# Patient Record
Sex: MALE | Race: WHITE | NOT HISPANIC OR LATINO | Employment: FULL TIME | URBAN - METROPOLITAN AREA
[De-identification: names, ages, dates, MRNs, and addresses within clinical notes are randomized per-mention and may not be internally consistent; named-entity substitution may affect disease eponyms.]

---

## 2017-04-07 ENCOUNTER — HOSPITAL ENCOUNTER (OUTPATIENT)
Facility: MEDICAL CENTER | Age: 42
End: 2017-04-07
Attending: PHYSICIAN ASSISTANT
Payer: COMMERCIAL

## 2017-04-07 ENCOUNTER — OFFICE VISIT (OUTPATIENT)
Dept: URGENT CARE | Facility: PHYSICIAN GROUP | Age: 42
End: 2017-04-07
Payer: COMMERCIAL

## 2017-04-07 VITALS
DIASTOLIC BLOOD PRESSURE: 84 MMHG | BODY MASS INDEX: 34.98 KG/M2 | TEMPERATURE: 99.3 F | OXYGEN SATURATION: 94 % | WEIGHT: 230 LBS | SYSTOLIC BLOOD PRESSURE: 132 MMHG | RESPIRATION RATE: 16 BRPM | HEART RATE: 68 BPM

## 2017-04-07 DIAGNOSIS — L05.01 PILONIDAL CYST WITH ABSCESS: Primary | ICD-10-CM

## 2017-04-07 PROCEDURE — 87070 CULTURE OTHR SPECIMN AEROBIC: CPT

## 2017-04-07 PROCEDURE — 10061 I&D ABSCESS COMP/MULTIPLE: CPT | Performed by: PHYSICIAN ASSISTANT

## 2017-04-07 RX ORDER — SULFAMETHOXAZOLE AND TRIMETHOPRIM 800; 160 MG/1; MG/1
1 TABLET ORAL 2 TIMES DAILY
Qty: 20 TAB | Refills: 0 | Status: SHIPPED | OUTPATIENT
Start: 2017-04-07 | End: 2017-04-07 | Stop reason: SDUPTHER

## 2017-04-07 RX ORDER — HYDROCODONE BITARTRATE AND ACETAMINOPHEN 5; 325 MG/1; MG/1
1-2 TABLET ORAL EVERY 4 HOURS PRN
Qty: 20 TAB | Refills: 0 | Status: SHIPPED | OUTPATIENT
Start: 2017-04-07 | End: 2017-04-10

## 2017-04-07 RX ORDER — SULFAMETHOXAZOLE AND TRIMETHOPRIM 800; 160 MG/1; MG/1
1 TABLET ORAL 2 TIMES DAILY
Qty: 20 TAB | Refills: 0 | Status: SHIPPED | OUTPATIENT
Start: 2017-04-07 | End: 2017-04-17

## 2017-04-07 NOTE — MR AVS SNAPSHOT
Jignesh Miguel   2017 5:45 PM   Office Visit   MRN: [id number]    Department:  [facility]   Dept Phone:  [phone]    Description:  Male : 1975   Provider:  Gaurav Zavaleta PA-C           Reason for Visit     Cyst on buttock near crack x 2 months - now getting bigger      Allergies as of 2017     No Known Allergies      You were diagnosed with     Pilonidal cyst with abscess   [685.0.ICD-9-CM]  -  Primary       Vital Signs     Blood Pressure Pulse Temperature Respirations Weight Oxygen Saturation    132/84 mmHg 68 37.4 °C (99.3 °F) 16 104.327 kg (230 lb) 94%    Smoking Status                   Never Smoker            Basic Information     Date Of Birth Sex Race Ethnicity Preferred Language    1975 Male White Non- English      Problem List              ICD-10-CM Priority Class Noted - Resolved    Disorder of male genital organs N50.9   2015 - Present      Health Maintenance        Date Due Completion Dates    IMM DTaP/Tdap/Td Vaccine (1 - Tdap) 1995 ---            Current Immunizations     No immunizations on file.      Below and/or attached are the medications your provider expects you to take. Review all of your home medications and newly ordered medications with your provider and/or pharmacist. Follow medication instructions as directed by your provider and/or pharmacist. Please keep your medication list with you and share with your provider. Update the information when medications are discontinued, doses are changed, or new medications (including over-the-counter products) are added; and carry medication information at all times in the event of emergency situations     Allergies:  No Known Allergies          Medications  Valid as of: 2017 -  7:05 PM    Generic Name Brand Name Tablet Size Instructions for use    Hydrocodone-Acetaminophen (Tab) NORCO 5-325 MG Take 1-2 Tabs by mouth every four hours as needed for up to 3 days.        Metoprolol-Hydrochlorothiazide (TABLET SR 24 HR) Metoprolol-Hydrochlorothiazide 25-12.5 MG Take 12.5 mg by mouth every day.        Sulfamethoxazole-Trimethoprim (Tab) BACTRIM -160 MG Take 1 Tab by mouth 2 times a day for 10 days.        .                 Medicines prescribed today were sent to:     [facility] [address] [place], [place] - [address]    [address] [place] [address]    Phone: [phone] Fax: [phone]    Open 24 Hours?: No      Medication refill instructions:       If your prescription bottle indicates you have medication refills left, it is not necessary to call your provider’s office. Please contact your pharmacy and they will refill your medication.    If your prescription bottle indicates you do not have any refills left, you may request refills at any time through one of the following ways: The online [employer] system (except Urgent Care), by calling your provider’s office, or by asking your pharmacy to contact your provider’s office with a refill request. Medication refills are processed only during regular business hours and may not be available until the next business day. Your provider may request additional information or to have a follow-up visit with you prior to refilling your medication.   *Please Note: Medication refills are assigned a new Rx number when refilled electronically. Your pharmacy may indicate that no refills were authorized even though a new prescription for the same medication is available at the pharmacy. Please request the medicine by name with the pharmacy before contacting your provider for a refill.        Instructions    Incision and Drainage of a Pilonidal Cyst  Incision and drainage is a surgical procedure to open and drain a fluid-filled sac that forms around a hair follicle in the tailbone area between your buttocks (pilonidal cyst). You may need this procedure if the cyst becomes painful, swollen, or infected.  There are three types of  procedures that may be done. The type of procedure you have depends on the size and severity of your infected cyst. The procedure may be:  · Incision and drainage with a special type of bandage (wound packing). Packing is used for wounds that are deep or tunnel under the skin.  · Marsupialization. In this procedure, the cyst will be opened and kept open. The edges of the incision will be stitched together to make a pocket.  · Incision and drainage without wound packing.  LET YOUR HEALTH CARE PROVIDER KNOW ABOUT:  · Any allergies you have.  · All medicines you are taking, including vitamins, herbs, eye drops, creams, and over-the-counter medicines.  · Previous problems you or members of your family have had with the use of anesthetics.  · Any blood disorders you have.  · Previous surgeries you have had.  · Medical conditions you have.  RISKS AND COMPLICATIONS  Generally, this is a safe procedure. However, problems can occur and include:  · Infection.  · Bleeding.  · Having another cyst develop.  · Need for more surgery.  BEFORE THE PROCEDURE  · Ask your health care provider about:  ¨ Changing or stopping your regular medicines. This is especially important if you are taking diabetes medicines or blood thinners.  ¨ Taking medicines such as aspirin and ibuprofen. These medicines can thin your blood. Do not take these medicines before your procedure if your health care provider tells you not to.  ¨ Taking antibiotics before surgery to control the infection.  · Do not eat or drink anything for 6-8 hours before the procedure if you are having general anesthesia.  · Take a shower the night before the procedure to clean your buttocks area. Take another shower in the morning before surgery.  · Plan to have someone take you home after the procedure.  PROCEDURE   · You will have an IV tube inserted in a vein in your hand or arm.  · You will be given one of the following:  ¨ A medicine that numbs the area (local  anesthetic).  ¨ A medicine that makes you go to sleep (general anesthetic).  · You also may be given medicine to help you relax during the procedure (sedative).  · You will lie face down on the operating table.  · Your buttocks area may be shaved.  · Tape may be used to spread your buttocks.  · Germ-killing solution (antiseptic) may be used to clean the area.  Incision and Drainage With Wound Packing:   · Your surgeon will make a surgical cut (incision) over the cyst to open it.  · A probe may be used to see if there are tunnels extending away from the cyst under your skin.  · Fluid or pus inside the cyst will be drained.  · The cyst will be flushed out with a germ-free (sterile) solution.  · Packing will be placed into the open cyst. This keeps it open and draining after surgery.  · The area will be covered with a bandage (dressing).  Marsupialization:  · Your surgeon will make a surgical cut (incision) over the cyst to open it.  · A probe may be used to see if there are tunnels extending away from the cyst under your skin.  · Fluid or pus inside the cyst will be drained.  · The cyst will be flushed out with a germ-free (sterile) solution.  · The edges of the incision will be stitched (sutured) to the skin to keep it wide open. The cyst will not be packed.  · A rolled-up bandage (dressing) will be taped over the incision.  Incision and Drainage Without Packing:   · Your surgeon will make a surgical cut (incision) over the cyst to open it.  · A probe may be used to see if there are tunnels extending away from the cyst under your skin.  · Fluid or pus inside the cyst will be drained.  · The cyst will be flushed out with a germ-free (sterile) solution.  · Your surgeon may also remove the tissue around the opened cyst.  · The incision then will be closed with stitches (sutures). It will not be left open, and packing will not be used.  · A bandage (dressing) will be put over the incision area.  AFTER THE PROCEDURE  · If  you had general anesthesia, you will be taken to a recovery area. Your blood pressure, heart rate, breathing rate, and blood oxygen level will be monitored often until the medicines you were given have worn off.  · It is normal to have some pain after this procedure. You may be given pain medicine.  · Your IV tube can be taken out after you have recovered and your pain is under control.     This information is not intended to replace advice given to you by your health care provider. Make sure you discuss any questions you have with your health care provider.     Document Released: 07/15/2015 Document Reviewed: 07/15/2015  [employer] Interactive Patient Education ©2016 Elsevier Inc.            Innovative Roads Access Code: [email or web address]  Expires: 4/30/2017 10:52 AM    Innovative Roads  A secure, online tool to manage your health information     [employer]’s Innovative Roads® is a secure, online tool that connects you to your personalized health information from the privacy of your home -- day or night - making it very easy for you to manage your healthcare. Once the activation process is completed, you can even access your medical information using the Innovative Roads daisha, which is available for free in the Apple Daisha store or Google Play store.     Innovative Roads provides the following levels of access (as shown below):   My Chart Features   [facility] Doctor [facility]  Specialists [facility]  Urgent  Care Non-[facility]  Primary Care  Doctor   Email your healthcare team securely and privately 24/7 X X X    Manage appointments: schedule your next appointment; view details of past/upcoming appointments X      Request prescription refills. X      View recent personal medical records, including lab and immunizations X X X X   View health record, including health history, allergies, medications X X X X   Read reports about your outpatient visits, procedures, consult and ER notes X X X X   See your discharge summary, which is a recap of your hospital and/or ER  visit that includes your diagnosis, lab results, and care plan. X X       How to register for Live Life 360:  1. Go to  [email or web address].  2. Click on the Sign Up Now box, which takes you to the New Member Sign Up page. You will need to provide the following information:  a. Enter your Live Life 360 Access Code exactly as it appears at the top of this page. (You will not need to use this code after you’ve completed the sign-up process. If you do not sign up before the expiration date, you must request a new code.)   b. Enter your date of birth.   c. Enter your home email address.   d. Click Submit, and follow the next screen’s instructions.  3. Create a LearnZilliont ID. This will be your LearnZilliont login ID and cannot be changed, so think of one that is secure and easy to remember.  4. Create a LearnZilliont password. You can change your password at any time.  5. Enter your Password Reset Question and Answer. This can be used at a later time if you forget your password.   6. Enter your e-mail address. This allows you to receive e-mail notifications when new information is available in Live Life 360.  7. Click Sign Up. You can now view your health information.    For assistance activating your Live Life 360 account, call [phone]

## 2017-04-07 NOTE — Clinical Note
April 7, 2017       Patient: Jignesh Miguel   YOB: 1975   Date of Visit: 4/7/2017         To Whom It May Concern:    It is my medical opinion that Jignesh Miguel may be excused from work for the date of 4/10/17.      If you have any questions or concerns, please don't hesitate to call [phone]          Sincerely,          Gaurav Zavaleta PA-C  Electronically Signed

## 2017-04-08 DIAGNOSIS — L05.01 PILONIDAL CYST WITH ABSCESS: ICD-10-CM

## 2017-04-08 NOTE — PATIENT INSTRUCTIONS
Incision and Drainage of a Pilonidal Cyst  Incision and drainage is a surgical procedure to open and drain a fluid-filled sac that forms around a hair follicle in the tailbone area between your buttocks (pilonidal cyst). You may need this procedure if the cyst becomes painful, swollen, or infected.  There are three types of procedures that may be done. The type of procedure you have depends on the size and severity of your infected cyst. The procedure may be:  · Incision and drainage with a special type of bandage (wound packing). Packing is used for wounds that are deep or tunnel under the skin.  · Marsupialization. In this procedure, the cyst will be opened and kept open. The edges of the incision will be stitched together to make a pocket.  · Incision and drainage without wound packing.  LET YOUR HEALTH CARE PROVIDER KNOW ABOUT:  · Any allergies you have.  · All medicines you are taking, including vitamins, herbs, eye drops, creams, and over-the-counter medicines.  · Previous problems you or members of your family have had with the use of anesthetics.  · Any blood disorders you have.  · Previous surgeries you have had.  · Medical conditions you have.  RISKS AND COMPLICATIONS  Generally, this is a safe procedure. However, problems can occur and include:  · Infection.  · Bleeding.  · Having another cyst develop.  · Need for more surgery.  BEFORE THE PROCEDURE  · Ask your health care provider about:  ¨ Changing or stopping your regular medicines. This is especially important if you are taking diabetes medicines or blood thinners.  ¨ Taking medicines such as aspirin and ibuprofen. These medicines can thin your blood. Do not take these medicines before your procedure if your health care provider tells you not to.  ¨ Taking antibiotics before surgery to control the infection.  · Do not eat or drink anything for 6-8 hours before the procedure if you are having general anesthesia.  · Take a shower the night before the  procedure to clean your buttocks area. Take another shower in the morning before surgery.  · Plan to have someone take you home after the procedure.  PROCEDURE   · You will have an IV tube inserted in a vein in your hand or arm.  · You will be given one of the following:  ¨ A medicine that numbs the area (local anesthetic).  ¨ A medicine that makes you go to sleep (general anesthetic).  · You also may be given medicine to help you relax during the procedure (sedative).  · You will lie face down on the operating table.  · Your buttocks area may be shaved.  · Tape may be used to spread your buttocks.  · Germ-killing solution (antiseptic) may be used to clean the area.  Incision and Drainage With Wound Packing:   · Your surgeon will make a surgical cut (incision) over the cyst to open it.  · A probe may be used to see if there are tunnels extending away from the cyst under your skin.  · Fluid or pus inside the cyst will be drained.  · The cyst will be flushed out with a germ-free (sterile) solution.  · Packing will be placed into the open cyst. This keeps it open and draining after surgery.  · The area will be covered with a bandage (dressing).  Marsupialization:  · Your surgeon will make a surgical cut (incision) over the cyst to open it.  · A probe may be used to see if there are tunnels extending away from the cyst under your skin.  · Fluid or pus inside the cyst will be drained.  · The cyst will be flushed out with a germ-free (sterile) solution.  · The edges of the incision will be stitched (sutured) to the skin to keep it wide open. The cyst will not be packed.  · A rolled-up bandage (dressing) will be taped over the incision.  Incision and Drainage Without Packing:   · Your surgeon will make a surgical cut (incision) over the cyst to open it.  · A probe may be used to see if there are tunnels extending away from the cyst under your skin.  · Fluid or pus inside the cyst will be drained.  · The cyst will be  flushed out with a germ-free (sterile) solution.  · Your surgeon may also remove the tissue around the opened cyst.  · The incision then will be closed with stitches (sutures). It will not be left open, and packing will not be used.  · A bandage (dressing) will be put over the incision area.  AFTER THE PROCEDURE  · If you had general anesthesia, you will be taken to a recovery area. Your blood pressure, heart rate, breathing rate, and blood oxygen level will be monitored often until the medicines you were given have worn off.  · It is normal to have some pain after this procedure. You may be given pain medicine.  · Your IV tube can be taken out after you have recovered and your pain is under control.     This information is not intended to replace advice given to you by your health care provider. Make sure you discuss any questions you have with your health care provider.     Document Released: 07/15/2015 Document Reviewed: 07/15/2015  [email or web address][employer] Interactive Patient Education ©2016 Elsevier Inc.

## 2017-04-08 NOTE — PROGRESS NOTES
Subjective:      Pt is a 41 y.o. male who presents with Cyst            Cyst  This is a new problem. The current episode started more than 1 month ago. The problem occurs constantly. The problem has been gradually worsening. The symptoms are aggravated by exertion (sitting or palpation). He has tried ice and NSAIDs for the symptoms. The treatment provided no relief.   Pt notes a growing mass in his gluteal cleft for over a month which has become painful to touch and sit down with. Pt has not taken any Rx medications for this condition. Pt states the pain is a 7/10, aching in nature and worse at night or with sitting. Pt denies CP, SOB, NVD, paresthesias, headaches, dizziness, change in vision, hives, or other joint pain. The pt's medication list, problem list, and allergies have been evaluated and reviewed during today's visit.    PMH:  Past Medical History   Diagnosis Date   • Hypertension    • Psychiatric problem      anxiety       PSH:  Past Surgical History   Procedure Laterality Date   • Orchiectomy  4/8/2015     Performed by Alejandro Alatorre M.D. at SURGERY [facility]       Fam Hx:  Father with hx of HTN    Soc HX:  Social History     Social History   • Marital Status:      Spouse Name: N/A   • Number of Children: N/A   • Years of Education: N/A     Occupational History   • Not on file.     Social History Main Topics   • Smoking status: Never Smoker    • Smokeless tobacco: Never Used   • Alcohol Use: No   • Drug Use: No   • Sexual Activity: Not on file     Other Topics Concern   • Not on file     Social History Narrative   • No narrative on file         Medications:    Current outpatient prescriptions:   •  sulfamethoxazole-trimethoprim (BACTRIM DS) 800-160 MG tablet, Take 1 Tab by mouth 2 times a day for 10 days., Disp: 20 Tab, Rfl: 0  •  hydrocodone-acetaminophen (NORCO) 5-325 MG Tab per tablet, Take 1-2 Tabs by mouth every four hours as needed for up to 3 days., Disp: 20 Tab, Rfl: 0  •   Metoprolol-Hydrochlorothiazide 25-12.5 MG TB24, Take 12.5 mg by mouth every day., Disp: , Rfl:       Allergies:  Review of patient's allergies indicates no known allergies.        ROS  Constitutional: Negative for fever, chills and malaise/fatigue.   HENT: Negative for congestion and sore throat.    Eyes: Negative for blurred vision, double vision and photophobia.   Respiratory: Negative for cough and shortness of breath.    Cardiovascular: Negative for chest pain and palpitations.   Gastrointestinal: Negative for heartburn, nausea, vomiting, abdominal pain, diarrhea and constipation.   Genitourinary: Negative for dysuria and flank pain.   Musculoskeletal: Negative for joint pain and myalgias.   Skin: POS for pilonidal cyst  Neurological: Negative for dizziness, tingling and headaches.   Endo/Heme/Allergies: Does not bruise/bleed easily.   Psychiatric/Behavioral: Negative for depression. The patient is not nervous/anxious.           Objective:     /84 mmHg  Pulse 68  Temp(Src) 37.4 °C (99.3 °F)  Resp 16  Wt 104.327 kg (230 lb)  SpO2 94%     Physical Exam   Skin: Skin is warm. Lesion noted. No abrasion, no bruising, no burn, no ecchymosis, no laceration, no petechiae and no rash noted. He is not diaphoretic. There is erythema.              Constitutional: PT is oriented to person, place, and time. PT appears well-developed and well-nourished. No distress.   HENT:   Head: Normocephalic and atraumatic.   Mouth/Throat: Oropharynx is clear and moist. No oropharyngeal exudate.   Eyes: Conjunctivae normal and EOM are normal. Pupils are equal, round, and reactive to light.   Neck: Normal range of motion. Neck supple. No thyromegaly present.   Cardiovascular: Normal rate, regular rhythm, normal heart sounds and intact distal pulses.  Exam reveals no gallop and no friction rub.    No murmur heard.  Pulmonary/Chest: Effort normal and breath sounds normal. No respiratory distress. PT has no wheezes. PT has no rales.  "Pt exhibits no tenderness.   Abdominal: Soft. Bowel sounds are normal. PT exhibits no distension and no mass. There is no tenderness. There is no rebound and no guarding.   Musculoskeletal: Normal range of motion. PT exhibits no edema and no tenderness.   Neurological: PT is alert and oriented to person, place, and time. PT has normal reflexes. No cranial nerve deficit.       Psychiatric: PT has a normal mood and affect. PT behavior is normal. Judgment and thought content normal.          Assessment/Plan:     1. Pilonidal cyst with abscess    - sulfamethoxazole-trimethoprim (BACTRIM DS) 800-160 MG tablet; Take 1 Tab by mouth 2 times a day for 10 days.  Dispense: 20 Tab; Refill: 0  - hydrocodone-acetaminophen (NORCO) 5-325 MG Tab per tablet; Take 1-2 Tabs by mouth every four hours as needed for up to 3 days.  Dispense: 20 Tab; Refill: 0  - culture and gram stain    Procedure: Incision and Drainage  -Risks, benefits, and alternatives discussed. Risks including infection, bleeding, nerve damage, and poor cosmetic outcome  -Sterile technique throughout  -Local anesthesia with 2% lidocaine with epinephrine  -Incision with #11 blade into fluctuant area with purulent material expressed  -Culture obtained and packaged for lab  -Cavity probed and MULTIPLE loculations bluntly taken down with hemostat  -Irrigated copiously with NS  -Packed with 1/4\" gauze  -Minimal bleeding with good hemostasis achieved  -The patient tolerated the procedure well      [place]  Aware web site evaluation: I have obtained and reviewed patient utilization report from [facility] pharmacy database prior to writing prescription for controlled substance II, III or IV per [place] bill . Based on the report and my clinical assessment the prescription is medically necessary.   NSAIDs for pain 1-5, Norco for pain 6-10 or to help get to sleep.  Wound and dressing care discussed  Rest, fluids encouraged.  AVS with medical info given.  Pt was in full " understanding and agreement with the plan.  Follow-up 3 days for possible repacking and wound check

## 2017-04-10 ENCOUNTER — OFFICE VISIT (OUTPATIENT)
Dept: URGENT CARE | Facility: PHYSICIAN GROUP | Age: 42
End: 2017-04-10
Payer: COMMERCIAL

## 2017-04-10 VITALS
TEMPERATURE: 99 F | HEART RATE: 92 BPM | BODY MASS INDEX: 37.87 KG/M2 | SYSTOLIC BLOOD PRESSURE: 130 MMHG | DIASTOLIC BLOOD PRESSURE: 88 MMHG | OXYGEN SATURATION: 95 % | WEIGHT: 249 LBS

## 2017-04-10 DIAGNOSIS — Z51.89 WOUND CHECK, ABSCESS: Primary | ICD-10-CM

## 2017-04-10 LAB
BACTERIA WND AEROBE CULT: NORMAL
SIGNIFICANT IND 70042: NORMAL
SITE SITE: NORMAL
SOURCE SOURCE: NORMAL

## 2017-04-10 PROCEDURE — 99024 POSTOP FOLLOW-UP VISIT: CPT | Performed by: PHYSICIAN ASSISTANT

## 2017-04-10 NOTE — MR AVS SNAPSHOT
Jignesh Miguel   4/10/2017 5:45 PM   Office Visit   MRN: [id number]    Department:  [facility]   Dept Phone:  [phone]    Description:  Male : 1975   Provider:  Gaurav Zavaleta PA-C           Reason for Visit     Wound Check sore      Allergies as of 4/10/2017     No Known Allergies      You were diagnosed with     Wound check, abscess   [[id number]]  -  Primary       Vital Signs     Blood Pressure Pulse Temperature Weight Oxygen Saturation Smoking Status    130/88 mmHg 92 37.2 °C (99 °F) 112.946 kg (249 lb) 95% Never Smoker       Basic Information     Date Of Birth Sex Race Ethnicity Preferred Language    1975 Male White Non- English      Problem List              ICD-10-CM Priority Class Noted - Resolved    Disorder of male genital organs N50.9   2015 - Present      Health Maintenance        Date Due Completion Dates    IMM DTaP/Tdap/Td Vaccine (1 - Tdap) 1995 ---            Current Immunizations     No immunizations on file.      Below and/or attached are the medications your provider expects you to take. Review all of your home medications and newly ordered medications with your provider and/or pharmacist. Follow medication instructions as directed by your provider and/or pharmacist. Please keep your medication list with you and share with your provider. Update the information when medications are discontinued, doses are changed, or new medications (including over-the-counter products) are added; and carry medication information at all times in the event of emergency situations     Allergies:  No Known Allergies          Medications  Valid as of: April 10, 2017 -  6:07 PM    Generic Name Brand Name Tablet Size Instructions for use    Hydrocodone-Acetaminophen (Tab) NORCO 5-325 MG Take 1-2 Tabs by mouth every four hours as needed for up to 3 days.        Metoprolol-Hydrochlorothiazide (TABLET SR 24 HR) Metoprolol-Hydrochlorothiazide 25-12.5 MG Take 12.5 mg  by mouth every day.        Sulfamethoxazole-Trimethoprim (Tab) BACTRIM -160 MG Take 1 Tab by mouth 2 times a day for 10 days.        .                 Medicines prescribed today were sent to:     [facility] [address] [place] [address] [address] [facility]    [address] [facility] [address]    Phone: [phone] Fax: [phone]    Open 24 Hours?: No      Medication refill instructions:       If your prescription bottle indicates you have medication refills left, it is not necessary to call your provider’s office. Please contact your pharmacy and they will refill your medication.    If your prescription bottle indicates you do not have any refills left, you may request refills at any time through one of the following ways: The online [employer] system (except Urgent Care), by calling your provider’s office, or by asking your pharmacy to contact your provider’s office with a refill request. Medication refills are processed only during regular business hours and may not be available until the next business day. Your provider may request additional information or to have a follow-up visit with you prior to refilling your medication.   *Please Note: Medication refills are assigned a new Rx number when refilled electronically. Your pharmacy may indicate that no refills were authorized even though a new prescription for the same medication is available at the pharmacy. Please request the medicine by name with the pharmacy before contacting your provider for a refill.        Instructions    Abscess  Care After  An abscess (also called a boil or furuncle) is an infected area that contains a collection of pus. Signs and symptoms of an abscess include pain, tenderness, redness, or hardness, or you may feel a moveable soft area under your skin. An abscess can occur anywhere in the body. The infection may spread to surrounding tissues causing cellulitis. A cut (incision) by the surgeon was made over your abscess and  the pus was drained out. Gauze may have been packed into the space to provide a drain that will allow the cavity to heal from the inside outwards. The boil may be painful for 5 to 7 days. Most people with a boil do not have high fevers. Your abscess, if seen early, may not have localized, and may not have been lanced. If not, another appointment may be required for this if it does not get better on its own or with medications.  HOME CARE INSTRUCTIONS   · Only take over-the-counter or prescription medicines for pain, discomfort, or fever as directed by your caregiver.  · When you bathe, soak and then remove gauze or iodoform packs at least daily or as directed by your caregiver. You may then wash the wound gently with mild soapy water. Repack with gauze or do as your caregiver directs.  SEEK IMMEDIATE MEDICAL CARE IF:   · You develop increased pain, swelling, redness, drainage, or bleeding in the wound site.  · You develop signs of generalized infection including muscle aches, chills, fever, or a general ill feeling.  · An oral temperature above 102° F (38.9° C) develops, not controlled by medication.  See your caregiver for a recheck if you develop any of the symptoms described above. If medications (antibiotics) were prescribed, take them as directed.  Document Released: 07/06/2006 Document Revised: 03/11/2013 Document Reviewed: 03/02/2009  [employer]® Patient Information ©2014 [employer]            "[employer]" Access Code: [email or web address]  Expires: 4/30/2017 10:52 AM    "[employer]"  A secure, online tool to manage your health information     [employer]’s "[employer]"® is a secure, online tool that connects you to your personalized health information from the privacy of your home -- day or night - making it very easy for you to manage your healthcare. Once the activation process is completed, you can even access your medical information using the "[employer]" daisha, which is available for free in the Apple Daisha store or [employer]  Play store.     Vertra provides the following levels of access (as shown below):   My Chart Features   [facility] Doctor [facility]  Specialists [facility]  Urgent  Care [facility]  Primary Care  Doctor   Email your healthcare team securely and privately 24/7 X X X    Manage appointments: schedule your next appointment; view details of past/upcoming appointments X      Request prescription refills. X      View recent personal medical records, including lab and immunizations X X X X   View health record, including health history, allergies, medications X X X X   Read reports about your outpatient visits, procedures, consult and ER notes X X X X   See your discharge summary, which is a recap of your hospital and/or ER visit that includes your diagnosis, lab results, and care plan. X X       How to register for Vertra:  1. Go to  [email or web address].  2. Click on the Sign Up Now box, which takes you to the New Member Sign Up page. You will need to provide the following information:  a. Enter your Vertra Access Code exactly as it appears at the top of this page. (You will not need to use this code after you’ve completed the sign-up process. If you do not sign up before the expiration date, you must request a new code.)   b. Enter your date of birth.   c. Enter your home email address.   d. Click Submit, and follow the next screen’s instructions.  3. Create a Vertra ID. This will be your Vertra login ID and cannot be changed, so think of one that is secure and easy to remember.  4. Create a Vertra password. You can change your password at any time.  5. Enter your Password Reset Question and Answer. This can be used at a later time if you forget your password.   6. Enter your e-mail address. This allows you to receive e-mail notifications when new information is available in Vertra.  7. Click Sign Up. You can now view your health information.    For assistance activating your Vertra account, call [phone]

## 2017-04-10 NOTE — Clinical Note
April 10, 2017       Patient: Jignesh Miguel   YOB: 1975   Date of Visit: 4/10/2017         To Whom It May Concern:    It is my medical opinion that Jignesh Miguel may be excused from work for the dates of 4/11/17-4/12/17.      If you have any questions or concerns, please don't hesitate to call [phone]          Sincerely,          Gaurav Zavaleta PA-C  Electronically Signed

## 2017-04-11 NOTE — PROGRESS NOTES
HPI:   Presents for wound check 3 days post procedure/ I&D. Wound without pain or redness, minimal discharge noted. Dressing in place.    ROS:    no fever, no sensory loss, no weakness    Exam:    Gen: WDWN in no distress  Wound: well healing wound.  No evidence of dehiscence or infection.  Neuro: sensory/motor intact     A/P    Abscess I&D wound check  Continue abx, probiotics  F/u prn    DRESSING CHANGE:    Dressing and packing removed with trace drainage    Wound irrigated with NS    Packing not replaced  Dressing applied    Pt tolerated procedure well      cx results:  Component Results      Component     Significant Indicator     NEG     Source     WND     Site     pilonidal cyst     Culture Result Wound     Rare growth Mixed skin emily.   Heavy growth mixed anaerobic emily including   Peptostreptococci and anaerobic gram negative rods.

## 2017-04-11 NOTE — PATIENT INSTRUCTIONS
Abscess  Care After  An abscess (also called a boil or furuncle) is an infected area that contains a collection of pus. Signs and symptoms of an abscess include pain, tenderness, redness, or hardness, or you may feel a moveable soft area under your skin. An abscess can occur anywhere in the body. The infection may spread to surrounding tissues causing cellulitis. A cut (incision) by the surgeon was made over your abscess and the pus was drained out. Gauze may have been packed into the space to provide a drain that will allow the cavity to heal from the inside outwards. The boil may be painful for 5 to 7 days. Most people with a boil do not have high fevers. Your abscess, if seen early, may not have localized, and may not have been lanced. If not, another appointment may be required for this if it does not get better on its own or with medications.  HOME CARE INSTRUCTIONS   · Only take over-the-counter or prescription medicines for pain, discomfort, or fever as directed by your caregiver.  · When you bathe, soak and then remove gauze or iodoform packs at least daily or as directed by your caregiver. You may then wash the wound gently with mild soapy water. Repack with gauze or do as your caregiver directs.  SEEK IMMEDIATE MEDICAL CARE IF:   · You develop increased pain, swelling, redness, drainage, or bleeding in the wound site.  · You develop signs of generalized infection including muscle aches, chills, fever, or a general ill feeling.  · An oral temperature above 102° F (38.9° C) develops, not controlled by medication.  See your caregiver for a recheck if you develop any of the symptoms described above. If medications (antibiotics) were prescribed, take them as directed.  Document Released: 07/06/2006 Document Revised: 03/11/2013 Document Reviewed: 03/02/2009  [employer]® Patient Information ©2014 [employer].

## 2017-04-14 ENCOUNTER — TELEPHONE (OUTPATIENT)
Dept: URGENT CARE | Facility: PHYSICIAN GROUP | Age: 42
End: 2017-04-14

## 2017-04-19 ENCOUNTER — TELEPHONE (OUTPATIENT)
Dept: URGENT CARE | Facility: CLINIC | Age: 42
End: 2017-04-19

## 2017-04-19 NOTE — TELEPHONE ENCOUNTER
PT called in requesting more medication for pain as his pilonidal cyst I&D location is still causing him discomfort while on vacation.  I spoke with him today, 4/19/17 and encouraged him to stop by the  at [facility] which is the one he's been seen at for this and get  A wound check and evaluation and possibly more pain medication depending on the provider's comfort level with doing so.  Pt states he will stop by later today and is appreciative for the phone call.  Gaurav Zavaleta PA-C

## 2017-05-05 ENCOUNTER — OFFICE VISIT (OUTPATIENT)
Dept: URGENT CARE | Facility: PHYSICIAN GROUP | Age: 42
End: 2017-05-05
Payer: COMMERCIAL

## 2017-05-05 ENCOUNTER — HOSPITAL ENCOUNTER (OUTPATIENT)
Facility: MEDICAL CENTER | Age: 42
End: 2017-05-05
Attending: FAMILY MEDICINE
Payer: COMMERCIAL

## 2017-05-05 VITALS
HEART RATE: 76 BPM | RESPIRATION RATE: 18 BRPM | TEMPERATURE: 98.6 F | OXYGEN SATURATION: 97 % | BODY MASS INDEX: 37.87 KG/M2 | DIASTOLIC BLOOD PRESSURE: 98 MMHG | WEIGHT: 249 LBS | SYSTOLIC BLOOD PRESSURE: 162 MMHG

## 2017-05-05 DIAGNOSIS — L05.01 PILONIDAL CYST WITH ABSCESS: ICD-10-CM

## 2017-05-05 LAB
GRAM STN SPEC: NORMAL
SIGNIFICANT IND 70042: NORMAL
SITE SITE: NORMAL
SOURCE SOURCE: NORMAL

## 2017-05-05 PROCEDURE — 87070 CULTURE OTHR SPECIMN AEROBIC: CPT

## 2017-05-05 PROCEDURE — 87205 SMEAR GRAM STAIN: CPT

## 2017-05-05 PROCEDURE — 10061 I&D ABSCESS COMP/MULTIPLE: CPT | Performed by: FAMILY MEDICINE

## 2017-05-05 PROCEDURE — 87075 CULTR BACTERIA EXCEPT BLOOD: CPT

## 2017-05-05 RX ORDER — SULFAMETHOXAZOLE AND TRIMETHOPRIM 800; 160 MG/1; MG/1
2 TABLET ORAL 2 TIMES DAILY
Qty: 20 TAB | Refills: 0 | Status: SHIPPED | OUTPATIENT
Start: 2017-05-05 | End: 2017-05-10

## 2017-05-05 RX ORDER — OXYCODONE AND ACETAMINOPHEN 10; 325 MG/1; MG/1
1-2 TABLET ORAL EVERY 4 HOURS PRN
Qty: 15 TAB | Refills: 0 | Status: SHIPPED | OUTPATIENT
Start: 2017-05-05 | End: 2019-06-19

## 2017-05-05 NOTE — PATIENT INSTRUCTIONS
Pilonidal Cyst  A pilonidal cyst is a fluid-filled sac. It forms beneath the skin near your tailbone, at the top of the crease of your buttocks. A pilonidal cyst that is not large or infected may not cause symptoms or problems.  If the cyst becomes irritated or infected, it may fill with pus. This causes pain and swelling (pilonidal abscess). An infected cyst may need to be treated with medicine, drained, or removed.  CAUSES  The cause of a pilonidal cyst is not known. One cause may be a hair that grows into your skin (ingrown hair).  RISK FACTORS  Pilonidal cysts are more common in boys and men. Risk factors include:  · Having lots of hair near the crease of the buttocks.  · Being overweight.  · Having a pilonidal dimple.  · Wearing tight clothing.  · Not bathing or showering frequently.  · Sitting for long periods of time.  SIGNS AND SYMPTOMS  Signs and symptoms of a pilonidal cyst may include:  · Redness.  · Pain and tenderness.  · Warmth.  · Swelling.  · Pus.  · Fever.  DIAGNOSIS  Your health care provider may diagnose a pilonidal cyst based on your symptoms and a physical exam. The health care provider may do a blood test to check for infection. If your cyst is draining pus, your health care provider may take a sample of the drainage to be tested at a laboratory.  TREATMENT  Surgery is the usual treatment for an infected pilonidal cyst. You may also have to take medicines before surgery. The type of surgery you have depends on the size and severity of the infected cyst. The different kinds of surgery include:  · Incision and drainage. This is a procedure to open and drain the cyst.  · Marsupialization. In this procedure, a large cyst or abscess may be opened and kept open by stitching the edges of the skin to the cyst walls.  · Cyst removal. This procedure involves opening the skin and removing all or part of the cyst.  HOME CARE INSTRUCTIONS  · Follow all of your surgeon's instructions carefully if you had  surgery.  · Take medicines only as directed by your health care provider.  · If you were prescribed an antibiotic medicine, finish it all even if you start to feel better.  · Keep the area around your pilonidal cyst clean and dry.  · Clean the area as directed by your health care provider. Pat the area dry with a clean towel. Do not rub it as this may cause bleeding.  · Remove hair from the area around the cyst as directed by your health care provider.  · Do not wear tight clothing or sit in one place for long periods of time.  · There are many different ways to close and cover an incision, including stitches, skin glue, and adhesive strips. Follow your health care provider's instructions on:  ¨ Incision care.  ¨ Bandage (dressing) changes and removal.  ¨ Incision closure removal.  SEEK MEDICAL CARE IF:   · You have drainage, redness, swelling, or pain at the site of the cyst.  · You have a fever.     This information is not intended to replace advice given to you by your health care provider. Make sure you discuss any questions you have with your health care provider.     Document Released: 12/15/2001 Document Revised: 01/08/2016 Document Reviewed: 05/07/2015  [employer] Interactive Patient Education ©2016 [employer]

## 2017-05-05 NOTE — MR AVS SNAPSHOT
Jignesh Miguel   2017 8:30 AM   Office Visit   MRN: [id number]    Department:  [facility]   Dept Phone:  [phone]    Description:  Male : 1975   Provider:  Jignesh Nolan M.D.           Reason for Visit     Cyst on tail bone reoccurant      Allergies as of 2017     No Known Allergies      You were diagnosed with     Pilonidal cyst with abscess   [685.0.ICD-9-CM]         Vital Signs     Blood Pressure Pulse Temperature Respirations Weight Oxygen Saturation    162/98 mmHg 76 37 °C (98.6 °F) 18 112.946 kg (249 lb) 97%    Smoking Status                   Never Smoker            Basic Information     Date Of Birth Sex Race Ethnicity Preferred Language    1975 Male White Non- English      Problem List              ICD-10-CM Priority Class Noted - Resolved    Disorder of male genital organs N50.9   2015 - Present      Health Maintenance        Date Due Completion Dates    IMM DTaP/Tdap/Td Vaccine (1 - Tdap) 1995 ---            Current Immunizations     No immunizations on file.      Below and/or attached are the medications your provider expects you to take. Review all of your home medications and newly ordered medications with your provider and/or pharmacist. Follow medication instructions as directed by your provider and/or pharmacist. Please keep your medication list with you and share with your provider. Update the information when medications are discontinued, doses are changed, or new medications (including over-the-counter products) are added; and carry medication information at all times in the event of emergency situations     Allergies:  No Known Allergies          Medications  Valid as of: May 05, 2017 - 11:15 AM    Generic Name Brand Name Tablet Size Instructions for use    Metoprolol-Hydrochlorothiazide (TABLET SR 24 HR) Metoprolol-Hydrochlorothiazide 25-12.5 MG Take 12.5 mg by mouth every day.        Oxycodone-Acetaminophen (Tab)  PERCOCET-10  MG Take 1-2 Tabs by mouth every four hours as needed for Severe Pain.        Sulfamethoxazole-Trimethoprim (Tab) BACTRIM -160 MG Take 2 Tabs by mouth 2 times a day for 5 days.        .                 Medicines prescribed today were sent to:     [facility] [address] [place] [address], [place] - [address] [facility]    [address] [facility] [place] [address]    Phone: [phone] Fax: [phone]    Open 24 Hours?: No      Medication refill instructions:       If your prescription bottle indicates you have medication refills left, it is not necessary to call your provider’s office. Please contact your pharmacy and they will refill your medication.    If your prescription bottle indicates you do not have any refills left, you may request refills at any time through one of the following ways: The online [employer] system (except Urgent Care), by calling your provider’s office, or by asking your pharmacy to contact your provider’s office with a refill request. Medication refills are processed only during regular business hours and may not be available until the next business day. Your provider may request additional information or to have a follow-up visit with you prior to refilling your medication.   *Please Note: Medication refills are assigned a new Rx number when refilled electronically. Your pharmacy may indicate that no refills were authorized even though a new prescription for the same medication is available at the pharmacy. Please request the medicine by name with the pharmacy before contacting your provider for a refill.        Referral     A referral request has been sent to our patient care coordination department. Please allow 3-5 business days for us to process this request and contact you either by phone or mail. If you do not hear from us by the 5th business day, please call us at [phone].        Instructions    Pilonidal Cyst  A pilonidal cyst is a fluid-filled sac. It forms beneath  the skin near your tailbone, at the top of the crease of your buttocks. A pilonidal cyst that is not large or infected may not cause symptoms or problems.  If the cyst becomes irritated or infected, it may fill with pus. This causes pain and swelling (pilonidal abscess). An infected cyst may need to be treated with medicine, drained, or removed.  CAUSES  The cause of a pilonidal cyst is not known. One cause may be a hair that grows into your skin (ingrown hair).  RISK FACTORS  Pilonidal cysts are more common in boys and men. Risk factors include:  · Having lots of hair near the crease of the buttocks.  · Being overweight.  · Having a pilonidal dimple.  · Wearing tight clothing.  · Not bathing or showering frequently.  · Sitting for long periods of time.  SIGNS AND SYMPTOMS  Signs and symptoms of a pilonidal cyst may include:  · Redness.  · Pain and tenderness.  · Warmth.  · Swelling.  · Pus.  · Fever.  DIAGNOSIS  Your health care provider may diagnose a pilonidal cyst based on your symptoms and a physical exam. The health care provider may do a blood test to check for infection. If your cyst is draining pus, your health care provider may take a sample of the drainage to be tested at a laboratory.  TREATMENT  Surgery is the usual treatment for an infected pilonidal cyst. You may also have to take medicines before surgery. The type of surgery you have depends on the size and severity of the infected cyst. The different kinds of surgery include:  · Incision and drainage. This is a procedure to open and drain the cyst.  · Marsupialization. In this procedure, a large cyst or abscess may be opened and kept open by stitching the edges of the skin to the cyst walls.  · Cyst removal. This procedure involves opening the skin and removing all or part of the cyst.  HOME CARE INSTRUCTIONS  · Follow all of your surgeon's instructions carefully if you had surgery.  · Take medicines only as directed by your health care  provider.  · If you were prescribed an antibiotic medicine, finish it all even if you start to feel better.  · Keep the area around your pilonidal cyst clean and dry.  · Clean the area as directed by your health care provider. Pat the area dry with a clean towel. Do not rub it as this may cause bleeding.  · Remove hair from the area around the cyst as directed by your health care provider.  · Do not wear tight clothing or sit in one place for long periods of time.  · There are many different ways to close and cover an incision, including stitches, skin glue, and adhesive strips. Follow your health care provider's instructions on:  ¨ Incision care.  ¨ Bandage (dressing) changes and removal.  ¨ Incision closure removal.  SEEK MEDICAL CARE IF:   · You have drainage, redness, swelling, or pain at the site of the cyst.  · You have a fever.     This information is not intended to replace advice given to you by your health care provider. Make sure you discuss any questions you have with your health care provider.     Document Released: 12/15/2001 Document Revised: 01/08/2016 Document Reviewed: 05/07/2015  [employer] Interactive Patient Education ©2016 [employer]vier Inc.            Spectropath Access Code: [email or web address]  Expires: 5/31/2017  1:21 PM    Spectropath  A secure, online tool to manage your health information     [employer] Spectropath® is a secure, online tool that connects you to your personalized health information from the privacy of your home -- day or night - making it very easy for you to manage your healthcare. Once the activation process is completed, you can even access your medical information using the Spectropath daisha, which is available for free in the Apple Daisha store or Google Play store.     Spectropath provides the following levels of access (as shown below):   My Chart Features   [facility] Doctor [facility]  Specialists [facility]  Urgent  Care Non-[facility]  Primary Care  Doctor   Email your healthcare team  securely and privately 24/7 X X X    Manage appointments: schedule your next appointment; view details of past/upcoming appointments X      Request prescription refills. X      View recent personal medical records, including lab and immunizations X X X X   View health record, including health history, allergies, medications X X X X   Read reports about your outpatient visits, procedures, consult and ER notes X X X X   See your discharge summary, which is a recap of your hospital and/or ER visit that includes your diagnosis, lab results, and care plan. X X       How to register for WishLink:  1. Go to  [email or web address].  2. Click on the Sign Up Now box, which takes you to the New Member Sign Up page. You will need to provide the following information:  a. Enter your WishLink Access Code exactly as it appears at the top of this page. (You will not need to use this code after you’ve completed the sign-up process. If you do not sign up before the expiration date, you must request a new code.)   b. Enter your date of birth.   c. Enter your home email address.   d. Click Submit, and follow the next screen’s instructions.  3. Create a WishLink ID. This will be your WishLink login ID and cannot be changed, so think of one that is secure and easy to remember.  4. Create a WishLink password. You can change your password at any time.  5. Enter your Password Reset Question and Answer. This can be used at a later time if you forget your password.   6. Enter your e-mail address. This allows you to receive e-mail notifications when new information is available in WishLink.  7. Click Sign Up. You can now view your health information.    For assistance activating your WishLink account, call [phone]

## 2017-05-05 NOTE — PROGRESS NOTES
Subjective:      Chief Complaint   Patient presents with   • Nodule                     nodule  This is a new problem. The current episode started in the past 3 days. The problem is unchanged.  The rash is characterized by redness, swelling and pain. Pt was exposed to nothing. Pertinent negatives include no cough, fatigue, fever, shortness of breath or sore throat. Past treatments include nothing.     Past Medical History   Diagnosis Date   • Hypertension    • Psychiatric problem      anxiety         Social History   Substance Use Topics   • Smoking status: Never Smoker    • Smokeless tobacco: Never Used   • Alcohol Use: No             Review of Systems   Constitutional: Negative for fever and fatigue.   HENT: Negative for sore throat.    Respiratory: Negative for cough and shortness of breath.    Skin: Positive for nodule.   All other systems reviewed and are negative.         Objective:   Blood pressure 162/98, pulse 76, temperature 37 °C (98.6 °F), resp. rate 18, weight 112.946 kg (249 lb), SpO2 97 %.      Physical Exam   Constitutional: pt is oriented to person, place, and time. Pt appears well-developed and well-nourished. No distress.   HENT:   Head: Normocephalic and atraumatic.   Mouth/Throat: Oropharynx is clear and moist and mucous membranes are normal. No uvula swelling. No oropharyngeal exudate, posterior oropharyngeal edema or posterior oropharyngeal erythema.   Eyes: Conjunctivae are normal.   Cardiovascular: Normal rate, regular rhythm and normal heart sounds.    Pulmonary/Chest: Effort normal and breath sounds normal. No respiratory distress. She has no wheezes.   Neurological: pt is alert and oriented to person, place, and time. No cranial nerve deficit.   Skin: 3cm erythematous, fluctuant nodule, superior gluteal cleft. Pt is not diaphoretic.    Psychiatric: pt's behavior is normal.   Nursing note and vitals reviewed.              Assessment/Plan:           1. Abscess          After informed  consent was obtained -Risks, benefits, and alternatives discussed,   including infection, bleeding, nerve damage, and poor cosmetic outcome    Area was prepped and draped in usual sterile fashion.  After performing local field block with 1% lidocaine, with epinephrine, abscess was incised and drained and packed with iodoform gauze.  Blunt dissection was used to break up loculations.  Substantial amount of cheesy, purulent material was expressed, and cultures were taken and sent for gram staining.    Wound was cleaned and dressed and wound care instructions given.       course bactrim started empirically.       - ANAEROBIC/AEROBIC/GRAM STAIN

## 2017-05-07 ENCOUNTER — OFFICE VISIT (OUTPATIENT)
Dept: URGENT CARE | Facility: PHYSICIAN GROUP | Age: 42
End: 2017-05-07

## 2017-05-07 VITALS
HEART RATE: 85 BPM | OXYGEN SATURATION: 96 % | BODY MASS INDEX: 37.87 KG/M2 | TEMPERATURE: 98.1 F | WEIGHT: 249 LBS | SYSTOLIC BLOOD PRESSURE: 134 MMHG | DIASTOLIC BLOOD PRESSURE: 96 MMHG | RESPIRATION RATE: 16 BRPM

## 2017-05-07 DIAGNOSIS — Z51.89 WOUND CHECK, ABSCESS: ICD-10-CM

## 2017-05-07 LAB
BACTERIA WND AEROBE CULT: NORMAL
GRAM STN SPEC: NORMAL
SIGNIFICANT IND 70042: NORMAL
SITE SITE: NORMAL
SOURCE SOURCE: NORMAL

## 2017-05-07 PROCEDURE — 99024 POSTOP FOLLOW-UP VISIT: CPT | Performed by: PHYSICIAN ASSISTANT

## 2017-05-07 RX ORDER — OXYCODONE AND ACETAMINOPHEN 10; 325 MG/1; MG/1
1-2 TABLET ORAL EVERY 4 HOURS PRN
Qty: 15 TAB | Refills: 0 | Status: SHIPPED | OUTPATIENT
Start: 2017-05-07 | End: 2019-06-19

## 2017-05-07 NOTE — MR AVS SNAPSHOT
Jignesh Miguel   2017 9:15 AM   Office Visit   MRN: [id number]    Department:  [facility]   Dept Phone:  [phone]    Description:  Male : 1975   Provider:  Bety Lee PA-C           Reason for Visit     Wound Check treated for abscess 17      Allergies as of 2017     No Known Allergies      You were diagnosed with     Wound check, abscess   [[id number]]         Vital Signs     Blood Pressure Pulse Temperature Respirations Weight Oxygen Saturation    134/96 mmHg 85 36.7 °C (98.1 °F) 16 112.946 kg (249 lb) 96%    Smoking Status                   Never Smoker            Basic Information     Date Of Birth Sex Race Ethnicity Preferred Language    1975 Male White Non- English      Problem List              ICD-10-CM Priority Class Noted - Resolved    Disorder of male genital organs N50.9   2015 - Present      Health Maintenance        Date Due Completion Dates    IMM DTaP/Tdap/Td Vaccine (1 - Tdap) 1995 ---            Current Immunizations     No immunizations on file.      Below and/or attached are the medications your provider expects you to take. Review all of your home medications and newly ordered medications with your provider and/or pharmacist. Follow medication instructions as directed by your provider and/or pharmacist. Please keep your medication list with you and share with your provider. Update the information when medications are discontinued, doses are changed, or new medications (including over-the-counter products) are added; and carry medication information at all times in the event of emergency situations     Allergies:  No Known Allergies          Medications  Valid as of: May 07, 2017 -  9:44 AM    Generic Name Brand Name Tablet Size Instructions for use    Metoprolol-Hydrochlorothiazide (TABLET SR 24 HR) Metoprolol-Hydrochlorothiazide 25-12.5 MG Take 12.5 mg by mouth every day.        Oxycodone-Acetaminophen (Tab) PERCOCET-10   MG Take 1-2 Tabs by mouth every four hours as needed for Severe Pain.        Oxycodone-Acetaminophen (Tab) PERCOCET-10  MG Take 1-2 Tabs by mouth every four hours as needed for Severe Pain.        Sulfamethoxazole-Trimethoprim (Tab) BACTRIM -160 MG Take 2 Tabs by mouth 2 times a day for 5 days.        .                 Medicines prescribed today were sent to:     [facility] [address] [place] [address] - [address] [facility]    [address] [facility] [address]    Phone: [phone] Fax: [phone]    Open 24 Hours?: No      Medication refill instructions:       If your prescription bottle indicates you have medication refills left, it is not necessary to call your provider’s office. Please contact your pharmacy and they will refill your medication.    If your prescription bottle indicates you do not have any refills left, you may request refills at any time through one of the following ways: The online "[employer]" system (except Urgent Care), by calling your provider’s office, or by asking your pharmacy to contact your provider’s office with a refill request. Medication refills are processed only during regular business hours and may not be available until the next business day. Your provider may request additional information or to have a follow-up visit with you prior to refilling your medication.   *Please Note: Medication refills are assigned a new Rx number when refilled electronically. Your pharmacy may indicate that no refills were authorized even though a new prescription for the same medication is available at the pharmacy. Please request the medicine by name with the pharmacy before contacting your provider for a refill.           "[employer]" Access Code: [email or web address]  Expires: 5/31/2017  1:21 PM    "[employer]"  A secure, online tool to manage your health information     [employer]’s "[employer]"® is a secure, online tool that connects you to your personalized health information from the  privacy of your home -- day or night - making it very easy for you to manage your healthcare. Once the activation process is completed, you can even access your medical information using the [employer] daisha, which is available for free in the Apple Daisha store or Google Play store.     [employer] provides the following levels of access (as shown below):   My Chart Features   [facility] Doctor [facility]  Specialists [facility]  Urgent  Care Non-[facility]  Primary Care  Doctor   Email your healthcare team securely and privately 24/7 X X X    Manage appointments: schedule your next appointment; view details of past/upcoming appointments X      Request prescription refills. X      View recent personal medical records, including lab and immunizations X X X X   View health record, including health history, allergies, medications X X X X   Read reports about your outpatient visits, procedures, consult and ER notes X X X X   See your discharge summary, which is a recap of your hospital and/or ER visit that includes your diagnosis, lab results, and care plan. X X       How to register for [employer]:  1. Go to  [email or web address].  2. Click on the Sign Up Now box, which takes you to the New Member Sign Up page. You will need to provide the following information:  a. Enter your [employer] Access Code exactly as it appears at the top of this page. (You will not need to use this code after you’ve completed the sign-up process. If you do not sign up before the expiration date, you must request a new code.)   b. Enter your date of birth.   c. Enter your home email address.   d. Click Submit, and follow the next screen’s instructions.  3. Create a [employer] ID. This will be your [employer] login ID and cannot be changed, so think of one that is secure and easy to remember.  4. Create a [employer] password. You can change your password at any time.  5. Enter your Password Reset Question and Answer. This can be used at a later time if you forget  your password.   6. Enter your e-mail address. This allows you to receive e-mail notifications when new information is available in [employer].  7. Click Sign Up. You can now view your health information.    For assistance activating your [employer] account, call [phone]

## 2017-05-07 NOTE — PROGRESS NOTES
HPI:  Patient seen in urgent care 2 days ago for incision and drainage of wound.  He/She returns today for wound recheck/repacking.  Currently on Augmentin pending wound culture results.  Patient denies any fevers, increased pain or redness to the wound.  Patient denies and numbnes or weakness surrounding the site.     PE:  Vitals:  Tc  Gen: AOx4, NAD  Neuro/Vas/Tendon: no vascular compromise, sensation normal, no tendon injury, normal rom.  Skin: Healing wound, no extending erythema, some mild discharge noted on iodoform packing.      Wound was repacked.  Pt will return in 2 days for wound check.     A/P:  Pilonidal Abcess  Wound check

## 2017-05-09 ENCOUNTER — TELEPHONE (OUTPATIENT)
Dept: URGENT CARE | Facility: PHYSICIAN GROUP | Age: 42
End: 2017-05-09

## 2017-05-09 ENCOUNTER — OFFICE VISIT (OUTPATIENT)
Dept: URGENT CARE | Facility: PHYSICIAN GROUP | Age: 42
End: 2017-05-09
Payer: COMMERCIAL

## 2017-05-09 VITALS
RESPIRATION RATE: 18 BRPM | DIASTOLIC BLOOD PRESSURE: 92 MMHG | SYSTOLIC BLOOD PRESSURE: 158 MMHG | OXYGEN SATURATION: 96 % | TEMPERATURE: 98.3 F | HEART RATE: 72 BPM

## 2017-05-09 DIAGNOSIS — Z51.89 WOUND CHECK, ABSCESS: Primary | ICD-10-CM

## 2017-05-09 LAB
BACTERIA SPEC ANAEROBE CULT: ABNORMAL
BACTERIA SPEC ANAEROBE CULT: ABNORMAL
SIGNIFICANT IND 70042: ABNORMAL
SITE SITE: ABNORMAL
SOURCE SOURCE: ABNORMAL

## 2017-05-09 PROCEDURE — 99024 POSTOP FOLLOW-UP VISIT: CPT | Performed by: PHYSICIAN ASSISTANT

## 2017-05-09 NOTE — PATIENT INSTRUCTIONS
Incision and Drainage of a Pilonidal Cyst  Incision and drainage is a surgical procedure to open and drain a fluid-filled sac that forms around a hair follicle in the tailbone area between your buttocks (pilonidal cyst). You may need this procedure if the cyst becomes painful, swollen, or infected.  There are three types of procedures that may be done. The type of procedure you have depends on the size and severity of your infected cyst. The procedure may be:  · Incision and drainage with a special type of bandage (wound packing). Packing is used for wounds that are deep or tunnel under the skin.  · Marsupialization. In this procedure, the cyst will be opened and kept open. The edges of the incision will be stitched together to make a pocket.  · Incision and drainage without wound packing.  LET YOUR HEALTH CARE PROVIDER KNOW ABOUT:  · Any allergies you have.  · All medicines you are taking, including vitamins, herbs, eye drops, creams, and over-the-counter medicines.  · Previous problems you or members of your family have had with the use of anesthetics.  · Any blood disorders you have.  · Previous surgeries you have had.  · Medical conditions you have.  RISKS AND COMPLICATIONS  Generally, this is a safe procedure. However, problems can occur and include:  · Infection.  · Bleeding.  · Having another cyst develop.  · Need for more surgery.  BEFORE THE PROCEDURE  · Ask your health care provider about:  ¨ Changing or stopping your regular medicines. This is especially important if you are taking diabetes medicines or blood thinners.  ¨ Taking medicines such as aspirin and ibuprofen. These medicines can thin your blood. Do not take these medicines before your procedure if your health care provider tells you not to.  ¨ Taking antibiotics before surgery to control the infection.  · Do not eat or drink anything for 6-8 hours before the procedure if you are having general anesthesia.  · Take a shower the night before the  procedure to clean your buttocks area. Take another shower in the morning before surgery.  · Plan to have someone take you home after the procedure.  PROCEDURE   · You will have an IV tube inserted in a vein in your hand or arm.  · You will be given one of the following:  ¨ A medicine that numbs the area (local anesthetic).  ¨ A medicine that makes you go to sleep (general anesthetic).  · You also may be given medicine to help you relax during the procedure (sedative).  · You will lie face down on the operating table.  · Your buttocks area may be shaved.  · Tape may be used to spread your buttocks.  · Germ-killing solution (antiseptic) may be used to clean the area.  Incision and Drainage With Wound Packing:   · Your surgeon will make a surgical cut (incision) over the cyst to open it.  · A probe may be used to see if there are tunnels extending away from the cyst under your skin.  · Fluid or pus inside the cyst will be drained.  · The cyst will be flushed out with a germ-free (sterile) solution.  · Packing will be placed into the open cyst. This keeps it open and draining after surgery.  · The area will be covered with a bandage (dressing).  Marsupialization:  · Your surgeon will make a surgical cut (incision) over the cyst to open it.  · A probe may be used to see if there are tunnels extending away from the cyst under your skin.  · Fluid or pus inside the cyst will be drained.  · The cyst will be flushed out with a germ-free (sterile) solution.  · The edges of the incision will be stitched (sutured) to the skin to keep it wide open. The cyst will not be packed.  · A rolled-up bandage (dressing) will be taped over the incision.  Incision and Drainage Without Packing:   · Your surgeon will make a surgical cut (incision) over the cyst to open it.  · A probe may be used to see if there are tunnels extending away from the cyst under your skin.  · Fluid or pus inside the cyst will be drained.  · The cyst will be  flushed out with a germ-free (sterile) solution.  · Your surgeon may also remove the tissue around the opened cyst.  · The incision then will be closed with stitches (sutures). It will not be left open, and packing will not be used.  · A bandage (dressing) will be put over the incision area.  AFTER THE PROCEDURE  · If you had general anesthesia, you will be taken to a recovery area. Your blood pressure, heart rate, breathing rate, and blood oxygen level will be monitored often until the medicines you were given have worn off.  · It is normal to have some pain after this procedure. You may be given pain medicine.  · Your IV tube can be taken out after you have recovered and your pain is under control.     This information is not intended to replace advice given to you by your health care provider. Make sure you discuss any questions you have with your health care provider.     Document Released: 07/15/2015 Document Reviewed: 07/15/2015  [employer] Interactive Patient Education ©2016 [employer]

## 2017-05-09 NOTE — PROGRESS NOTES
HPI:   Presents for wound check 4 days post 2nd procedure/ I&D. Wound without pain or redness, minimal discharge noted. Dressing in place.    ROS:    no fever, no sensory loss, no weakness    Exam:    Gen: WDWN in no distress  Wound: well healing wound. . No evidence of dehiscence or infection.  Neuro: sensory/motor intact     A/P    Abscess I&D wound check  F/U with Gen Surg referral  Continue abx, probiotics      DRESSING CHANGE:    Dressing and packing removed with trace drainage    Wound irrigated with NS    Packing not replaced  Dressing applied    Pt tolerated procedure well      Cx:  Component Results      Component     Gram Stain Result     Many WBCs.   Many Gram positive cocci.        Significant Indicator     NEG     Source     WND     Site     Lower Back     Culture Result Wound     Light growth Mixed enteric emily.            ANAEROBIC CULTURE (Order [id number])         Component Results      Component     Significant Indicator     POS     Source     WND     Site     Lower Back     Anaerobic Culture, Culture Res (Abnormal)     Heavy growth Mixed anaerobic emily including     Anaerobic Culture, Culture Res (Abnormal)     Bacteroides fragilis Group

## 2017-05-09 NOTE — MR AVS SNAPSHOT
Jignesh Miguel   2017 9:30 AM   Office Visit   MRN: [id number]    Department:  [facility]   Dept Phone:  [phone]    Description:  Male : 1975   Provider:  Gaurav Zavaleta PA-C           Reason for Visit     Other re-check for cyst on the left lower back      Allergies as of 2017     No Known Allergies      Vital Signs     Blood Pressure Pulse Temperature Respirations Oxygen Saturation Smoking Status    158/92 mmHg 72 36.8 °C (98.3 °F) 18 96% Never Smoker       Basic Information     Date Of Birth Sex Race Ethnicity Preferred Language    1975 Male White Non- English      Problem List              ICD-10-CM Priority Class Noted - Resolved    Disorder of male genital organs N50.9   2015 - Present      Health Maintenance        Date Due Completion Dates    IMM DTaP/Tdap/Td Vaccine (1 - Tdap) 1995 ---            Current Immunizations     No immunizations on file.      Below and/or attached are the medications your provider expects you to take. Review all of your home medications and newly ordered medications with your provider and/or pharmacist. Follow medication instructions as directed by your provider and/or pharmacist. Please keep your medication list with you and share with your provider. Update the information when medications are discontinued, doses are changed, or new medications (including over-the-counter products) are added; and carry medication information at all times in the event of emergency situations     Allergies:  No Known Allergies          Medications  Valid as of: May 09, 2017 - 10:05 AM    Generic Name Brand Name Tablet Size Instructions for use    Metoprolol-Hydrochlorothiazide (TABLET SR 24 HR) Metoprolol-Hydrochlorothiazide 25-12.5 MG Take 12.5 mg by mouth every day.        Oxycodone-Acetaminophen (Tab) PERCOCET-10  MG Take 1-2 Tabs by mouth every four hours as needed for Severe Pain.        Oxycodone-Acetaminophen (Tab)  PERCOCET-10  MG Take 1-2 Tabs by mouth every four hours as needed for Severe Pain.        Sulfamethoxazole-Trimethoprim (Tab) BACTRIM -160 MG Take 2 Tabs by mouth 2 times a day for 5 days.        .                 Medicines prescribed today were sent to:     [facility] [address] [place] [address], [place] - [address] [facility]    [address] [facility] [place] [address]    Phone: [phone] Fax: [phone]    Open 24 Hours?: No      Medication refill instructions:       If your prescription bottle indicates you have medication refills left, it is not necessary to call your provider’s office. Please contact your pharmacy and they will refill your medication.    If your prescription bottle indicates you do not have any refills left, you may request refills at any time through one of the following ways: The online [employer] system (except Urgent Care), by calling your provider’s office, or by asking your pharmacy to contact your provider’s office with a refill request. Medication refills are processed only during regular business hours and may not be available until the next business day. Your provider may request additional information or to have a follow-up visit with you prior to refilling your medication.   *Please Note: Medication refills are assigned a new Rx number when refilled electronically. Your pharmacy may indicate that no refills were authorized even though a new prescription for the same medication is available at the pharmacy. Please request the medicine by name with the pharmacy before contacting your provider for a refill.           [employer] Access Code: [email or web address]  Expires: 5/31/2017  1:21 PM    [employer]  A secure, online tool to manage your health information     [employer]® is a secure, online tool that connects you to your personalized health information from the privacy of your home -- day or night - making it very easy for you to manage your healthcare. Once the activation  process is completed, you can even access your medical information using the PhoneFusion daisha, which is available for free in the Apple Daisha store or Google Play store.     PhoneFusion provides the following levels of access (as shown below):   My Chart Features   [facility] Doctor [facility]  Specialists [facility]  Urgent  Care Non-[facility]  Primary Care  Doctor   Email your healthcare team securely and privately 24/7 X X X    Manage appointments: schedule your next appointment; view details of past/upcoming appointments X      Request prescription refills. X      View recent personal medical records, including lab and immunizations X X X X   View health record, including health history, allergies, medications X X X X   Read reports about your outpatient visits, procedures, consult and ER notes X X X X   See your discharge summary, which is a recap of your hospital and/or ER visit that includes your diagnosis, lab results, and care plan. X X       How to register for PhoneF[employer]:  1. Go to  [email or web address].  2. Click on the Sign Up Now box, which takes you to the New Member Sign Up page. You will need to provide the following information:  a. Enter your PhoneFusion Access Code exactly as it appears at the top of this page. (You will not need to use this code after you’ve completed the sign-up process. If you do not sign up before the expiration date, you must request a new code.)   b. Enter your date of birth.   c. Enter your home email address.   d. Click Submit, and follow the next screen’s instructions.  3. Create a PhoneFusion ID. This will be your PhoneFusion login ID and cannot be changed, so think of one that is secure and easy to remember.  4. Create a PhoneFusion password. You can change your password at any time.  5. Enter your Password Reset Question and Answer. This can be used at a later time if you forget your password.   6. Enter your e-mail address. This allows you to receive e-mail notifications when new information  is available in [employer].  7. Click Sign Up. You can now view your health information.    For assistance activating your [employer] account, call [phone]

## 2017-09-06 ENCOUNTER — HOSPITAL ENCOUNTER (OUTPATIENT)
Dept: RADIOLOGY | Facility: MEDICAL CENTER | Age: 42
End: 2017-09-06
Attending: UROLOGY
Payer: COMMERCIAL

## 2017-09-06 DIAGNOSIS — R91.1 PULMONARY NODULE: ICD-10-CM

## 2017-09-06 PROCEDURE — 71020 DX-CHEST-2 VIEWS: CPT

## 2018-08-27 ENCOUNTER — APPOINTMENT (RX ONLY)
Dept: URBAN - METROPOLITAN AREA CLINIC 4 | Facility: CLINIC | Age: 43
Setting detail: DERMATOLOGY
End: 2018-08-27

## 2018-08-27 DIAGNOSIS — D18.0 HEMANGIOMA: ICD-10-CM

## 2018-08-27 DIAGNOSIS — L81.4 OTHER MELANIN HYPERPIGMENTATION: ICD-10-CM

## 2018-08-27 DIAGNOSIS — Z71.89 OTHER SPECIFIED COUNSELING: ICD-10-CM

## 2018-08-27 DIAGNOSIS — D22 MELANOCYTIC NEVI: ICD-10-CM

## 2018-08-27 DIAGNOSIS — L91.8 OTHER HYPERTROPHIC DISORDERS OF THE SKIN: ICD-10-CM

## 2018-08-27 PROBLEM — D48.5 NEOPLASM OF UNCERTAIN BEHAVIOR OF SKIN: Status: ACTIVE | Noted: 2018-08-27

## 2018-08-27 PROBLEM — D22.5 MELANOCYTIC NEVI OF TRUNK: Status: ACTIVE | Noted: 2018-08-27

## 2018-08-27 PROBLEM — D18.01 HEMANGIOMA OF SKIN AND SUBCUTANEOUS TISSUE: Status: ACTIVE | Noted: 2018-08-27

## 2018-08-27 PROCEDURE — ? BIOPSY BY SHAVE METHOD

## 2018-08-27 PROCEDURE — 11101: CPT

## 2018-08-27 PROCEDURE — ? BENIGN DESTRUCTION COSMETIC MULTI

## 2018-08-27 PROCEDURE — 99203 OFFICE O/P NEW LOW 30 MIN: CPT | Mod: 25

## 2018-08-27 PROCEDURE — ? COUNSELING

## 2018-08-27 PROCEDURE — 11100: CPT

## 2018-08-27 ASSESSMENT — LOCATION DETAILED DESCRIPTION DERM
LOCATION DETAILED: LEFT CLAVICULAR NECK
LOCATION DETAILED: LEFT MEDIAL INFERIOR CHEST
LOCATION DETAILED: LEFT INFERIOR LATERAL NECK
LOCATION DETAILED: RIGHT SUPERIOR LATERAL NECK
LOCATION DETAILED: RIGHT INFERIOR LATERAL NECK
LOCATION DETAILED: LEFT MEDIAL SUPERIOR CHEST

## 2018-08-27 ASSESSMENT — LOCATION SIMPLE DESCRIPTION DERM
LOCATION SIMPLE: LEFT ANTERIOR NECK
LOCATION SIMPLE: CHEST
LOCATION SIMPLE: POSTERIOR NECK
LOCATION SIMPLE: RIGHT ANTERIOR NECK

## 2018-08-27 ASSESSMENT — LOCATION ZONE DERM
LOCATION ZONE: NECK
LOCATION ZONE: TRUNK

## 2018-08-27 NOTE — PROCEDURE: BIOPSY BY SHAVE METHOD
Biopsy Method: Personna blade
Anesthesia Volume In Cc: 1
Bill 39813 For Specimen Handling/Conveyance To Laboratory?: no
Lab Facility: 
Was A Bandage Applied: Yes
Consent: Written consent was obtained and risks were reviewed including but not limited to scarring, infection, bleeding, scabbing, incomplete removal, nerve damage and allergy to anesthesia.
Cryotherapy Text: The wound bed was treated with cryotherapy after the biopsy was performed.
Depth Of Biopsy: dermis
Billing Type: Third-Party Bill
Biopsy Type: H and E
Hemostasis: Drysol
Electrodesiccation And Curettage Text: The wound bed was treated with electrodesiccation and curettage after the biopsy was performed.
Wound Care: Vaseline
Type Of Destruction Used: Curettage
Detail Level: Detailed
Lab: [id number]
Size Of Lesion In Cm: 0
Curettage Text: The wound bed was treated with curettage after the biopsy was performed.
Post-Care Instructions: I reviewed with the patient in detail post-care instructions. Patient is to keep the biopsy site dry overnight, and then apply bacitracin twice daily until healed. Patient may apply hydrogen peroxide soaks to remove any crusting.
Notification Instructions: Patient will be notified of biopsy results. However, patient instructed to call the office if not contacted within 2 weeks.
Electrodesiccation Text: The wound bed was treated with electrodesiccation after the biopsy was performed.
Anesthesia Type: 1% lidocaine with epinephrine
Dressing: bandage
Silver Nitrate Text: The wound bed was treated with silver nitrate after the biopsy was performed.

## 2018-08-27 NOTE — PROCEDURE: BENIGN DESTRUCTION COSMETIC MULTI
Detail Level: Simple
Post-Care Instructions: I reviewed with the patient in detail post-care instructions. Patient is to wear sunprotection, and avoid picking at any of the treated lesions. Pt may apply Vaseline to crusted or scabbing areas.
Total Number Of Lesions Treated: 12
Consent: The patient's consent was obtained including but not limited to risks of crusting, scabbing, blistering, scarring, darker or lighter pigmentary change, recurrence, incomplete removal and infection.
Anesthesia Volume In Cc: 0.5

## 2019-06-19 ENCOUNTER — OFFICE VISIT (OUTPATIENT)
Dept: URGENT CARE | Facility: PHYSICIAN GROUP | Age: 44
End: 2019-06-19
Payer: COMMERCIAL

## 2019-06-19 VITALS
OXYGEN SATURATION: 94 % | TEMPERATURE: 98.2 F | SYSTOLIC BLOOD PRESSURE: 172 MMHG | WEIGHT: 246 LBS | DIASTOLIC BLOOD PRESSURE: 98 MMHG | HEIGHT: 68 IN | RESPIRATION RATE: 18 BRPM | BODY MASS INDEX: 37.28 KG/M2 | HEART RATE: 107 BPM

## 2019-06-19 DIAGNOSIS — R05.9 COUGH: ICD-10-CM

## 2019-06-19 DIAGNOSIS — J40 BRONCHITIS: ICD-10-CM

## 2019-06-19 PROCEDURE — 99214 OFFICE O/P EST MOD 30 MIN: CPT | Performed by: PHYSICIAN ASSISTANT

## 2019-06-19 RX ORDER — TRIAMCINOLONE ACETONIDE 55 UG/1
2 SPRAY, METERED NASAL DAILY
Qty: 1 BOTTLE | Refills: 0 | Status: SHIPPED | OUTPATIENT
Start: 2019-06-19 | End: 2021-11-08

## 2019-06-19 RX ORDER — BENZONATATE 100 MG/1
100 CAPSULE ORAL 3 TIMES DAILY PRN
Qty: 30 CAP | Refills: 0 | Status: SHIPPED | OUTPATIENT
Start: 2019-06-19 | End: 2021-11-08

## 2019-06-19 RX ORDER — AZITHROMYCIN 250 MG/1
TABLET, FILM COATED ORAL
Qty: 6 TAB | Refills: 0 | Status: SHIPPED | OUTPATIENT
Start: 2019-06-19 | End: 2021-11-08

## 2019-06-19 RX ORDER — PROMETHAZINE HYDROCHLORIDE, PHENYLEPHRINE HYDROCHLORIDE AND CODEINE PHOSPHATE 6.25; 5; 1 MG/5ML; MG/5ML; MG/5ML
5 SOLUTION ORAL EVERY 8 HOURS PRN
Qty: 100 ML | Refills: 0 | Status: SHIPPED | OUTPATIENT
Start: 2019-06-19 | End: 2019-06-26

## 2019-06-19 ASSESSMENT — COPD QUESTIONNAIRES: COPD: 0

## 2019-06-19 ASSESSMENT — ENCOUNTER SYMPTOMS
SHORTNESS OF BREATH: 0
FEVER: 1
SPUTUM PRODUCTION: 1
SORE THROAT: 1
COUGH: 1
WHEEZING: 0
HEMOPTYSIS: 0

## 2019-06-19 NOTE — PROGRESS NOTES
Subjective:   Jignesh Miguel is a 43 y.o. male who presents for Cough (congestion, sore throat since friday)        Work's at [employer].  Pt has untreated essential hypertension- he plans to see his PCP regarding this issue in the next 7-14 days.  He is aware that he needs to restart antihypertensive tx.          Cough   This is a new problem. The current episode started in the past 7 days. The problem has been gradually worsening. The problem occurs constantly. The cough is productive of purulent sputum. Associated symptoms include ear congestion, a fever (subjective), nasal congestion, postnasal drip and a sore throat. Pertinent negatives include no chest pain, ear pain, hemoptysis, shortness of breath or wheezing. Exacerbated by: worse with laying down. He has tried OTC cough suppressant (dayquil, mucinex, nyquil) for the symptoms. The treatment provided mild relief. There is no history of asthma, bronchitis, COPD, emphysema, environmental allergies or pneumonia.     Review of Systems   Constitutional: Positive for fever (subjective).   HENT: Positive for postnasal drip and sore throat. Negative for ear pain.    Respiratory: Positive for cough and sputum production. Negative for hemoptysis, shortness of breath and wheezing.    Cardiovascular: Negative for chest pain and leg swelling.   Endo/Heme/Allergies: Negative for environmental allergies.       PMH:  has a past medical history of Hypertension and Psychiatric problem.  MEDS:   Current Outpatient Prescriptions:   •  Promethazine-Phenyleph-Codeine (PHENERGAN VC CODEINE) 6.25-5-10 MG/5ML Syrup, Take 5 mL by mouth every 8 hours as needed for up to 7 days., Disp: 100 mL, Rfl: 0  •  azithromycin (ZITHROMAX) 250 MG Tab, Take 2 tablets by mouth on day one. Take one tablet by mouth the remaining days until gone, Disp: 6 Tab, Rfl: 0  •  triamcinolone (NASACORT) 55 MCG/ACT nasal inhaler, Spray 2 Sprays in nose every day., Disp: 1 Bottle, Rfl: 0  •  benzonatate  "(TESSALON) 100 MG Cap, Take 1 Cap by mouth 3 times a day as needed., Disp: 30 Cap, Rfl: 0  •  Metoprolol-Hydrochlorothiazide 25-12.5 MG TB24, Take 12.5 mg by mouth every day., Disp: , Rfl:   ALLERGIES: No Known Allergies  SURGHX:   Past Surgical History:   Procedure Laterality Date   • ORCHIECTOMY  4/8/2015    Performed by Alejandro Alatorre M.D. at SURGERY [facility]     SOCHX:  reports that he has never smoked. He has never used smokeless tobacco. He reports that he does not drink alcohol or use drugs.  FH: Family history was reviewed, no pertinent findings to report   Objective:   BP (!) 172/98   Pulse (!) 107   Temp 36.8 °C (98.2 °F)   Resp 18   Ht 1.727 m (5' 8\")   Wt 111.6 kg (246 lb)   SpO2 94%   BMI 37.40 kg/m²   Physical Exam   Constitutional: He appears well-developed and well-nourished.  Non-toxic appearance. No distress.   HENT:   Head: Normocephalic and atraumatic.   Right Ear: External ear normal. A middle ear effusion is present.   Left Ear: External ear normal. A middle ear effusion is present.   Nose: Mucosal edema and rhinorrhea present. Right sinus exhibits no maxillary sinus tenderness. Left sinus exhibits no maxillary sinus tenderness.   Mouth/Throat: Uvula is midline and mucous membranes are normal. Posterior oropharyngeal erythema present. No tonsillar exudate.   Right tonsillith.   Neck: Neck supple.   Cardiovascular: Regular rhythm, S1 normal, S2 normal and normal heart sounds.  Tachycardia present.  Exam reveals no gallop and no friction rub.    No murmur heard.  Pulmonary/Chest: Effort normal. No respiratory distress. He has no decreased breath sounds. He has no wheezes. He has rhonchi (with cough). He has no rales.   Lymphadenopathy:     He has no cervical adenopathy.        Right cervical: No superficial cervical adenopathy present.       Left cervical: No superficial cervical adenopathy present.   Neurological: He is alert.   Skin: Skin is warm and dry. Capillary refill takes " less than 2 seconds.   Psychiatric: He has a normal mood and affect. His speech is normal and behavior is normal. Judgment and thought content normal. Cognition and memory are normal.   Vitals reviewed.        Assessment/Plan:   1. Bronchitis  - Promethazine-Phenyleph-Codeine (PHENERGAN VC CODEINE) 6.25-5-10 MG/5ML Syrup; Take 5 mL by mouth every 8 hours as needed for up to 7 days.  Dispense: 100 mL; Refill: 0  - azithromycin (ZITHROMAX) 250 MG Tab; Take 2 tablets by mouth on day one. Take one tablet by mouth the remaining days until gone  Dispense: 6 Tab; Refill: 0  - triamcinolone (NASACORT) 55 MCG/ACT nasal inhaler; Spray 2 Sprays in nose every day.  Dispense: 1 Bottle; Refill: 0  - benzonatate (TESSALON) 100 MG Cap; Take 1 Cap by mouth 3 times a day as needed.  Dispense: 30 Cap; Refill: 0    2. Cough  - Promethazine-Phenyleph-Codeine (PHENERGAN VC CODEINE) 6.25-5-10 MG/5ML Syrup; Take 5 mL by mouth every 8 hours as needed for up to 7 days.  Dispense: 100 mL; Refill: 0  - triamcinolone (NASACORT) 55 MCG/ACT nasal inhaler; Spray 2 Sprays in nose every day.  Dispense: 1 Bottle; Refill: 0  - benzonatate (TESSALON) 100 MG Cap; Take 1 Cap by mouth 3 times a day as needed.  Dispense: 30 Cap; Refill: 0    Patient appears to be developing bronchitis secondary to URI.  Patient started on azithromycin.    Pt instructed to complete full course of medication despite symptomatic improvement. Pt to take med with meals to alleviate GI upset. Pt to take a probiotic or eat Yudy’s yogurt/ kefir while taking the medication.    Prescription antitussives as needed.    Flonase 1 squirt in each nostril, as instructed in clinic, once a day.  Use nasal saline TID to promote drainage.   Salt water gurgles to soothe sore throat.  Ayr saline gel to moisturize nasal passage and prevent bleeding.  Try to use sudafed sparingly in order to allow sinuses to drain.  Avoid the longer formulations and try to take only in the morning and/or at noon  if needed.  If you fail to improve in 3-5 days or symptoms worsen/new symptoms develop, RTC for reevaluation.    Patient advised that blood pressure is high today.  He agrees to follow-up with his PCP so that he can discuss antihypertensive therapy.    Differential diagnosis, natural history, supportive care, and indications for immediate follow-up discussed.

## 2019-06-19 NOTE — LETTER
June 19, 2019    To Whom It May Concern:         This is confirmation that Jignesh Monique Myriam attended his scheduled appointment with Woo Sanches P.A.-C. on 6/19/19. Please excuse him from work on 6/19-6/21.         If you have any questions please do not hesitate to call me at the phone number listed below.    Sincerely,          Woo Sanches P.A.-C.  [phone]

## 2021-11-01 RX ORDER — LISINOPRIL 20 MG/1
20 TABLET ORAL DAILY
COMMUNITY
End: 2021-11-01 | Stop reason: SDUPTHER

## 2021-11-02 RX ORDER — LISINOPRIL 20 MG/1
20 TABLET ORAL DAILY
Qty: 30 TABLET | Refills: 11 | Status: SHIPPED | OUTPATIENT
Start: 2021-11-02 | End: 2021-11-08 | Stop reason: SDUPTHER

## 2021-11-08 ENCOUNTER — OFFICE VISIT (OUTPATIENT)
Dept: MEDICAL GROUP | Facility: CLINIC | Age: 46
End: 2021-11-08
Payer: COMMERCIAL

## 2021-11-08 VITALS
OXYGEN SATURATION: 95 % | BODY MASS INDEX: 37.28 KG/M2 | HEIGHT: 68 IN | WEIGHT: 246 LBS | HEART RATE: 91 BPM | DIASTOLIC BLOOD PRESSURE: 95 MMHG | SYSTOLIC BLOOD PRESSURE: 159 MMHG | RESPIRATION RATE: 16 BRPM

## 2021-11-08 DIAGNOSIS — I10 PRIMARY HYPERTENSION: ICD-10-CM

## 2021-11-08 DIAGNOSIS — D22.9 FLESHY SKIN MOLE: ICD-10-CM

## 2021-11-08 PROCEDURE — 99213 OFFICE O/P EST LOW 20 MIN: CPT | Performed by: FAMILY MEDICINE

## 2021-11-08 RX ORDER — HYDROCHLOROTHIAZIDE 12.5 MG/1
CAPSULE, GELATIN COATED ORAL
COMMUNITY
End: 2021-11-08

## 2021-11-08 RX ORDER — LISINOPRIL 20 MG/1
20 TABLET ORAL DAILY
Qty: 90 TABLET | Refills: 3 | Status: SHIPPED | OUTPATIENT
Start: 2021-11-08 | End: 2022-07-29 | Stop reason: SDUPTHER

## 2021-11-08 NOTE — PROGRESS NOTES
"Subjective:     CC: HTN, skin lesion    HPI:   Jignesh presents today to discuss the following issues     Problem   Primary Hypertension    Blood pressure today is a little high at 159/95.  Is not checked in a while but when he was documenting before it was generally in the 130s over 80 range.   Not using any decongestants NSAIDs or alcohol.  He did rush to get here and had troubles finding the building.     Fleshy Skin Mole    There is a smooth, raised, approximate 3 mm mole lateral to the left eye.  There is no pigment and there are no other concerning features.  It is not obstructing the eye closure or his vision.         Current Outpatient Medications Ordered in Epic   Medication Sig Dispense Refill   • lisinopril (PRINIVIL) 20 MG Tab Take 1 Tablet by mouth every day. 90 Tablet 3   • triamcinolone (NASACORT) 55 MCG/ACT nasal inhaler Spray 2 Sprays in nose every day. 1 Bottle 0     No current Epic-ordered facility-administered medications on file.       Health Maintenance:     ROS:  Gen: no fevers/chills, no changes in weight  Eyes: no changes in vision  ENT: no sore throat, no hearing loss, no bloody nose  Pulm: no sob, no cough  CV: no chest pain, no palpitations  GI: no nausea/vomiting, no diarrhea  : no dysuria  MSk: no myalgias  Skin: no rash  Neuro: no headaches, no numbness/tingling  Heme/Lymph: no easy bruising      Objective:     Exam:  /95 (BP Location: Right arm, Patient Position: Sitting, BP Cuff Size: Adult)   Pulse 91   Resp 16   Ht 1.727 m (5' 8\")   Wt 112 kg (246 lb)   SpO2 95%   BMI 37.40 kg/m²  Body mass index is 37.4 kg/m².    Gen: Alert and oriented, No apparent distress.  Neck: Neck is supple without lymphadenopathy.  Lungs: Normal effort, CTA bilaterally, no wheezes, rhonchi, or rales  CV: Regular rate and rhythm. No murmurs, rubs, or gallops.  Ext: No clubbing, cyanosis, edema.          Assessment & Plan:     45 y.o. male with the following -     Problem List Items Addressed " This Visit     Primary hypertension     Will refill lisinopril at the current dose for a year.  He is asked to document his pressures at least 3 times a week for several weeks, to let us know that  he is still in the right range.  I will make adjustments accordingly.         Relevant Medications    lisinopril (PRINIVIL) 20 MG Tab    Fleshy skin mole     This appears to be a benign lesion to me.  He is asked to follow with me if there are any changes in size, any pigmentation, or any erosion.               I spent a total of 15 minutes with record review, exam, communication with the patient, communication with other providers, and documentation of this encounter.      No follow-ups on file.

## 2021-11-09 NOTE — ASSESSMENT & PLAN NOTE
This appears to be a benign lesion to me.  He is asked to follow with me if there are any changes in size, any pigmentation, or any erosion.

## 2021-11-09 NOTE — ASSESSMENT & PLAN NOTE
Will refill lisinopril at the current dose for a year.  He is asked to document his pressures at least 3 times a week for several weeks, to let us know that  he is still in the right range.  I will make adjustments accordingly.

## 2022-03-03 NOTE — ADDENDUM NOTE
Addended by: SKYLAR LANDRUM on: 4/7/2017 07:09 PM     Modules accepted: Orders     Chief Complaint   Patient presents with   [facility] Annual Wellness Visit         1. Have you been to the ER, urgent care clinic since your last visit? Hospitalized since your last visit? No    2. Have you seen or consulted any other health care providers outside of the [address] [place] [address] since your last visit? Include any pap smears or colon screening.  No

## 2022-08-01 RX ORDER — LISINOPRIL 20 MG/1
20 TABLET ORAL DAILY
Qty: 90 TABLET | Refills: 3 | Status: SHIPPED | OUTPATIENT
Start: 2022-08-01 | End: 2023-12-06 | Stop reason: SDUPTHER

## 2023-08-16 NOTE — TELEPHONE ENCOUNTER
Received request via: Patient    Was the patient seen in the last year in this department? No     Does the patient have an active prescription (recently filled or refills available) for medication(s) requested? No      Patient is scheduled for appt on the 8th with MD. Has two pills left       
No

## 2023-12-06 ENCOUNTER — OFFICE VISIT (OUTPATIENT)
Dept: MEDICAL GROUP | Facility: CLINIC | Age: 48
End: 2023-12-06
Payer: COMMERCIAL

## 2023-12-06 VITALS
DIASTOLIC BLOOD PRESSURE: 95 MMHG | OXYGEN SATURATION: 97 % | TEMPERATURE: 97.9 F | RESPIRATION RATE: 13 BRPM | BODY MASS INDEX: 43.04 KG/M2 | WEIGHT: 284 LBS | SYSTOLIC BLOOD PRESSURE: 140 MMHG | HEART RATE: 90 BPM | HEIGHT: 68 IN

## 2023-12-06 DIAGNOSIS — Z23 NEED FOR VACCINATION: ICD-10-CM

## 2023-12-06 DIAGNOSIS — Z00.00 HEALTH CARE MAINTENANCE: ICD-10-CM

## 2023-12-06 DIAGNOSIS — E66.01 CLASS 3 SEVERE OBESITY DUE TO EXCESS CALORIES WITHOUT SERIOUS COMORBIDITY WITH BODY MASS INDEX (BMI) OF 40.0 TO 44.9 IN ADULT (HCC): ICD-10-CM

## 2023-12-06 DIAGNOSIS — R06.83 SNORING: ICD-10-CM

## 2023-12-06 DIAGNOSIS — I10 PRIMARY HYPERTENSION: ICD-10-CM

## 2023-12-06 DIAGNOSIS — Z11.4 SCREENING FOR HIV (HUMAN IMMUNODEFICIENCY VIRUS): ICD-10-CM

## 2023-12-06 DIAGNOSIS — Z11.59 NEED FOR HEPATITIS C SCREENING TEST: ICD-10-CM

## 2023-12-06 PROBLEM — E66.813 CLASS 3 SEVERE OBESITY WITHOUT SERIOUS COMORBIDITY WITH BODY MASS INDEX (BMI) OF 40.0 TO 44.9 IN ADULT (HCC): Status: ACTIVE | Noted: 2023-12-06

## 2023-12-06 PROCEDURE — 90686 IIV4 VACC NO PRSV 0.5 ML IM: CPT | Performed by: FAMILY MEDICINE

## 2023-12-06 PROCEDURE — 90471 IMMUNIZATION ADMIN: CPT | Performed by: FAMILY MEDICINE

## 2023-12-06 PROCEDURE — 99214 OFFICE O/P EST MOD 30 MIN: CPT | Mod: 25 | Performed by: FAMILY MEDICINE

## 2023-12-06 PROCEDURE — 3080F DIAST BP >= 90 MM HG: CPT | Performed by: FAMILY MEDICINE

## 2023-12-06 PROCEDURE — 3077F SYST BP >= 140 MM HG: CPT | Performed by: FAMILY MEDICINE

## 2023-12-06 RX ORDER — LISINOPRIL 20 MG/1
20 TABLET ORAL DAILY
Qty: 90 TABLET | Refills: 3 | Status: SHIPPED | OUTPATIENT
Start: 2023-12-06

## 2023-12-06 NOTE — PROGRESS NOTES
Subjective:     CC: HTN, Snoring, Weight    HPI:   Jignesh presents today to discuss the following issues        Problem   Snoring    Jignesh's spouse and mother both report that he snores very loudly particularly when laying on his back.  It does not sound as if he necessarily stops snoring frequently. There is minimal daytime sleepiness.      Health Care Maintenance    I reviewed care gaps with Jignesh.       Class 3 Severe Obesity Without Serious Comorbidity With Body Mass Index (Bmi) of 40.0 to 44.9 in Adult ([facility])    We did discuss nutrition, exercise health benefits of lowered weight.     Primary Hypertension    I have not seen her in over 2 years.  He has been out of his lisinopril for hypertension for a couple of weeks.  He tolerated that medicine well at 20 mg a day and it seemed to have been controlling his blood pressure.  He has a cuff at home but has not been checking it on a regular basis.    Prior note:    Blood pressure today is a little high at 159/95.  Is not checked in a while but when he was documenting before it was generally in the 130s over 80 range.   Not using any decongestants NSAIDs or alcohol.  He did rush to get here and had troubles finding the building.         Current Outpatient Medications Ordered in Epic   Medication Sig Dispense Refill    lisinopril (PRINIVIL) 20 MG Tab Take 1 Tablet by mouth every day. 90 Tablet 3     No current Epic-ordered facility-administered medications on file.       Health Maintenance:     ROS:  Gen: no fevers/chills, no changes in weight  Eyes: no changes in vision  ENT: no sore throat, no hearing loss, no bloody nose  Pulm: no sob, no cough  CV: no chest pain, no palpitations  GI: no nausea/vomiting, no diarrhea  : no dysuria  MSk: no myalgias  Skin: no rash  Neuro: no headaches, no numbness/tingling  Heme/Lymph: no easy bruising      Objective:     Exam:  BP (!) 140/95 (BP Location: Right arm, Patient Position: Sitting, BP Cuff Size: Large adult)   Pulse 90   " Temp 36.6 °C (97.9 °F) (Temporal)   Resp 13   Ht 1.727 m (5' 8\")   Wt (!) 129 kg (284 lb)   SpO2 97%   BMI 43.18 kg/m²  Body mass index is 43.18 kg/m².    Gen: Alert and oriented, No apparent distress.  Neck: Neck is supple without lymphadenopathy.  Lungs: Normal effort, CTA bilaterally, no wheezes, rhonchi, or rales  CV: Regular rate and rhythm. No murmurs, rubs, or gallops.  Ext: No clubbing, cyanosis, edema.          Assessment & Plan:     48 y.o. male with the following -     Problem List Items Addressed This Visit       Primary hypertension     I refilled the lisinopril and reviewed parameters.  His pressure today is 140/95.  Let me know if he does not meet target goals, either too high or too low.  Also going to check some labs which have not been done for a number of years.  We are also evaluating him for sleep apnea which if present could be playing a role here too.          Relevant Medications    lisinopril (PRINIVIL) 20 MG Tab    Other Relevant Orders    TSH WITH REFLEX TO FT4    Snoring     Jignesh has a BMI of 43. His STOP-BANG score is 6 which is at risk for moderate to severe EUGENE.   Mallimpotti score is class IV.  I placed a referral to Sleep Medicine for a sleep study         Relevant Orders    Referral to Pulmonary and Sleep Medicine    Health care maintenance     He has no significant family history of colon cancer so I will wait and refer him for colon cancer screening until he is 51.  Does not want hepatitis B vaccine agrees to HIV and HCV screening.  I had added those to the labs ordered.  Gave him a flu shot today.         Class 3 severe obesity without serious comorbidity with body mass index (BMI) of 40.0 to 44.9 in adult ([facility])     We will not go to medications at this point, but spent some time with nutrition and exercise.  He wants to work on this but will check back with me for further assistance if he is not able to achieve a steady weight loss with the diet he can live with " long-term.          Other Visit Diagnoses       Need for vaccination        Relevant Orders    INFLUENZA VACCINE QUAD INJ (PF) (Completed)    Screening for HIV (human immunodeficiency virus)        Relevant Orders    HIV AG/AB COMBO ASSAY SCREENING    Need for hepatitis C screening test        Relevant Orders    HEP C VIRUS ANTIBODY    CBC WITH DIFFERENTIAL    Comp Metabolic Panel    Lipid Profile            I spent a total of 34 minutes with record review, exam, communication with the patient, communication with other providers, and documentation of this encounter.      No follow-ups on file.

## 2023-12-07 NOTE — ASSESSMENT & PLAN NOTE
We will not go to medications at this point, but spent some time with nutrition and exercise.  He wants to work on this but will check back with me for further assistance if he is not able to achieve a steady weight loss with the diet he can live with long-term.

## 2023-12-07 NOTE — ASSESSMENT & PLAN NOTE
I refilled the lisinopril and reviewed parameters.  His pressure today is 140/95.  Let me know if he does not meet target goals, either too high or too low.  Also going to check some labs which have not been done for a number of years.  We are also evaluating him for sleep apnea which if present could be playing a role here too.

## 2023-12-07 NOTE — ASSESSMENT & PLAN NOTE
Jignesh has a BMI of 43. His STOP-BANG score is 6 which is at risk for moderate to severe EUGENE.   Mallimpotti score is class IV.  I placed a referral to Sleep Medicine for a sleep study

## 2023-12-07 NOTE — ASSESSMENT & PLAN NOTE
He has no significant family history of colon cancer so I will wait and refer him for colon cancer screening until he is 51.  Does not want hepatitis B vaccine agrees to HIV and HCV screening.  I had added those to the labs ordered.  Gave him a flu shot today.

## 2024-01-04 ENCOUNTER — TELEPHONE (OUTPATIENT)
Dept: HEALTH INFORMATION MANAGEMENT | Facility: OTHER | Age: 49
End: 2024-01-04
Payer: COMMERCIAL

## 2024-03-29 ENCOUNTER — HOSPITAL ENCOUNTER (OUTPATIENT)
Dept: LAB | Facility: MEDICAL CENTER | Age: 49
End: 2024-03-29
Attending: FAMILY MEDICINE
Payer: COMMERCIAL

## 2024-03-29 DIAGNOSIS — I10 PRIMARY HYPERTENSION: ICD-10-CM

## 2024-03-29 DIAGNOSIS — Z11.59 NEED FOR HEPATITIS C SCREENING TEST: ICD-10-CM

## 2024-03-29 DIAGNOSIS — Z11.4 SCREENING FOR HIV (HUMAN IMMUNODEFICIENCY VIRUS): ICD-10-CM

## 2024-03-29 LAB
ALBUMIN SERPL BCP-MCNC: 4.9 G/DL (ref 3.2–4.9)
ALBUMIN/GLOB SERPL: 1.6 G/DL
ALP SERPL-CCNC: 75 U/L (ref 30–99)
ALT SERPL-CCNC: 70 U/L (ref 2–50)
ANION GAP SERPL CALC-SCNC: 12 MMOL/L (ref 7–16)
AST SERPL-CCNC: 47 U/L (ref 12–45)
BASOPHILS # BLD AUTO: 0.8 % (ref 0–1.8)
BASOPHILS # BLD: 0.08 K/UL (ref 0–0.12)
BILIRUB SERPL-MCNC: 1.4 MG/DL (ref 0.1–1.5)
BUN SERPL-MCNC: 18 MG/DL (ref 8–22)
CALCIUM ALBUM COR SERPL-MCNC: 9.4 MG/DL (ref 8.5–10.5)
CALCIUM SERPL-MCNC: 10.1 MG/DL (ref 8.5–10.5)
CHLORIDE SERPL-SCNC: 99 MMOL/L (ref 96–112)
CHOLEST SERPL-MCNC: 249 MG/DL (ref 100–199)
CO2 SERPL-SCNC: 25 MMOL/L (ref 20–33)
CREAT SERPL-MCNC: 0.82 MG/DL (ref 0.5–1.4)
EOSINOPHIL # BLD AUTO: 0.17 K/UL (ref 0–0.51)
EOSINOPHIL NFR BLD: 1.6 % (ref 0–6.9)
ERYTHROCYTE [DISTWIDTH] IN BLOOD BY AUTOMATED COUNT: 42.5 FL (ref 35.9–50)
GFR SERPLBLD CREATININE-BSD FMLA CKD-EPI: 109 ML/MIN/1.73 M 2
GLOBULIN SER CALC-MCNC: 3.1 G/DL (ref 1.9–3.5)
GLUCOSE SERPL-MCNC: 90 MG/DL (ref 65–99)
HCT VFR BLD AUTO: 52.1 % (ref 42–52)
HCV AB SER QL: NORMAL
HDLC SERPL-MCNC: 52 MG/DL
HGB BLD-MCNC: 17.7 G/DL (ref 14–18)
HIV 1+2 AB+HIV1 P24 AG SERPL QL IA: NORMAL
IMM GRANULOCYTES # BLD AUTO: 0.03 K/UL (ref 0–0.11)
IMM GRANULOCYTES NFR BLD AUTO: 0.3 % (ref 0–0.9)
LDLC SERPL CALC-MCNC: 169 MG/DL
LYMPHOCYTES # BLD AUTO: 2.27 K/UL (ref 1–4.8)
LYMPHOCYTES NFR BLD: 21.6 % (ref 22–41)
MCH RBC QN AUTO: 31.2 PG (ref 27–33)
MCHC RBC AUTO-ENTMCNC: 34 G/DL (ref 32.3–36.5)
MCV RBC AUTO: 91.9 FL (ref 81.4–97.8)
MONOCYTES # BLD AUTO: 0.78 K/UL (ref 0–0.85)
MONOCYTES NFR BLD AUTO: 7.4 % (ref 0–13.4)
NEUTROPHILS # BLD AUTO: 7.17 K/UL (ref 1.82–7.42)
NEUTROPHILS NFR BLD: 68.3 % (ref 44–72)
NRBC # BLD AUTO: 0 K/UL
NRBC BLD-RTO: 0 /100 WBC (ref 0–0.2)
PLATELET # BLD AUTO: 260 K/UL (ref 164–446)
PMV BLD AUTO: 11.1 FL (ref 9–12.9)
POTASSIUM SERPL-SCNC: 4.7 MMOL/L (ref 3.6–5.5)
PROT SERPL-MCNC: 8 G/DL (ref 6–8.2)
RBC # BLD AUTO: 5.67 M/UL (ref 4.7–6.1)
SODIUM SERPL-SCNC: 136 MMOL/L (ref 135–145)
TRIGL SERPL-MCNC: 141 MG/DL (ref 0–149)
TSH SERPL DL<=0.005 MIU/L-ACNC: 1.27 UIU/ML (ref 0.38–5.33)
WBC # BLD AUTO: 10.5 K/UL (ref 4.8–10.8)

## 2024-03-29 PROCEDURE — 86803 HEPATITIS C AB TEST: CPT

## 2024-03-29 PROCEDURE — 84443 ASSAY THYROID STIM HORMONE: CPT

## 2024-03-29 PROCEDURE — 85025 COMPLETE CBC W/AUTO DIFF WBC: CPT

## 2024-03-29 PROCEDURE — 80053 COMPREHEN METABOLIC PANEL: CPT

## 2024-03-29 PROCEDURE — 80061 LIPID PANEL: CPT

## 2024-03-29 PROCEDURE — 87389 HIV-1 AG W/HIV-1&-2 AB AG IA: CPT

## 2024-03-29 PROCEDURE — 36415 COLL VENOUS BLD VENIPUNCTURE: CPT

## 2024-05-28 RX ORDER — LISINOPRIL 20 MG/1
20 TABLET ORAL DAILY
Qty: 90 TABLET | Refills: 3 | Status: SHIPPED | OUTPATIENT
Start: 2024-05-28

## 2024-05-28 NOTE — TELEPHONE ENCOUNTER
Received request via: Pharmacy    Was the patient seen in the last year in this department? Yes    Does the patient have an active prescription (recently filled or refills available) for medication(s) requested? No    Pharmacy Name: [facility] [address] [place] [address], [place] [address] [facility]     Does the patient have long term Plus and need 100 day supply (blood pressure, diabetes and cholesterol meds only)? Patient does not have SCP

## 2024-08-24 ENCOUNTER — OFFICE VISIT (OUTPATIENT)
Dept: URGENT CARE | Facility: PHYSICIAN GROUP | Age: 49
End: 2024-08-24
Payer: COMMERCIAL

## 2024-08-24 VITALS
HEART RATE: 112 BPM | BODY MASS INDEX: 42.44 KG/M2 | SYSTOLIC BLOOD PRESSURE: 132 MMHG | DIASTOLIC BLOOD PRESSURE: 94 MMHG | WEIGHT: 280 LBS | RESPIRATION RATE: 16 BRPM | HEIGHT: 68 IN | TEMPERATURE: 97.2 F | OXYGEN SATURATION: 95 %

## 2024-08-24 DIAGNOSIS — S61.217A LACERATION OF LEFT LITTLE FINGER WITHOUT FOREIGN BODY WITHOUT DAMAGE TO NAIL, INITIAL ENCOUNTER: ICD-10-CM

## 2024-08-24 PROCEDURE — 3075F SYST BP GE 130 - 139MM HG: CPT

## 2024-08-24 PROCEDURE — 12002 RPR S/N/AX/GEN/TRNK2.6-7.5CM: CPT | Mod: F4

## 2024-08-24 PROCEDURE — 90715 TDAP VACCINE 7 YRS/> IM: CPT

## 2024-08-24 PROCEDURE — 90471 IMMUNIZATION ADMIN: CPT

## 2024-08-24 PROCEDURE — 3080F DIAST BP >= 90 MM HG: CPT

## 2024-08-24 ASSESSMENT — FIBROSIS 4 INDEX: FIB4 SCORE: 1.02

## 2024-08-24 NOTE — PROGRESS NOTES
"Chief Complaint   Patient presents with    Laceration     Left pinky laceration x 1 hour ago          Subjective:   HISTORY OF PRESENT ILLNESS: Jignesh Miguel is a 48 y.o. male who presents for a left pinky laceration.  He was walking up stairs with a ceramic vase in his hands and missed a step the corner of the vase came down on his pinky finger and smashed between that and the tiles.  He can flex with and without resistance.    Patient denies n/t in the digit, no pain or swelling to the finger just pain at the laceration     Medications, Allergies, current problem list, Social and Family history reviewed today in Epic.     Objective:     BP (!) 132/94   Pulse (!) 112   Temp 36.2 °C (97.2 °F) (Temporal)   Resp 16   Ht 1.727 m (5' 8\")   Wt (!) 127 kg (280 lb)   SpO2 95%     Physical Exam  Vitals reviewed.   Constitutional:       Appearance: Normal appearance.   HENT:      Mouth/Throat:      Mouth: Mucous membranes are moist.   Cardiovascular:      Rate and Rhythm: Normal rate.   Pulmonary:      Effort: Pulmonary effort is normal.   Musculoskeletal:        Hands:       Comments: 3 cm laceration on the ventral 5th digit over the PIP   Skin:     General: Skin is warm and dry.   Neurological:      Mental Status: He is alert and oriented to person, place, and time.   Psychiatric:         Mood and Affect: Mood normal.          Assessment/Plan:     Diagnosis and associated orders    I personally reviewed prior external notes and test results pertinent to today's visit.     1. Laceration of left little finger without foreign body without damage to nail, initial encounter              IMPRESSION:  Pt has stable vital signs and no red flag symptoms or exam findings identified.  No indication of tendon injury. Laceration repair completed with 6 sutures, will look for s/s of infection and return to clinic in 10 days for removal.  Wound care discussed.  Is placed in splint    Laceration Repair Procedure     Risks " including bleeding, nerve damage, infection, and poor cosmetic outcome discussed. Benefits and alternative discussed.      Indication: Laceration     Location/Description: right dorsal forearm      Procedure: The patient was placed in the appropriate position and anesthesia around the laceration was obtained by digital block using 1% Lidocaine without epinephrine. The area was then cleansed with copious amounts of NS irrigation and betadine and draped in a sterile fashion. The laceration was closed with 6 4-0 Ethilon using interrupted sutures. There were no additional lacerations requiring repair. The wound area was then dressed with polysporin, nonstick dressing, and coban     Total repaired wound length: 3 cm.      Other Items: Suture count: 6     The patient tolerated the procedure well.     Complications: None    Differential diagnosis discussed. Pt was Educated on red flag symptoms. Pt has been Instructed to return to Urgent Care or nearest Emergency Department if symptoms fail to improve, for any change in condition, further concerns, or new concerning symptoms. Patient states understanding of the plan of care and discharge instructions.  They are discharged in stable condition.         Please note that this dictation was created using voice recognition software. I have made a reasonable attempt to correct obvious errors, but I expect that there are errors of grammar and possibly content that I did not discover before finalizing the note.    This note was electronically signed by KAMRON Suggs

## 2024-09-03 ENCOUNTER — OFFICE VISIT (OUTPATIENT)
Dept: URGENT CARE | Facility: PHYSICIAN GROUP | Age: 49
End: 2024-09-03
Payer: COMMERCIAL

## 2024-09-03 VITALS
BODY MASS INDEX: 42.44 KG/M2 | RESPIRATION RATE: 18 BRPM | HEIGHT: 68 IN | WEIGHT: 280 LBS | HEART RATE: 85 BPM | SYSTOLIC BLOOD PRESSURE: 126 MMHG | TEMPERATURE: 97.1 F | OXYGEN SATURATION: 95 % | DIASTOLIC BLOOD PRESSURE: 88 MMHG

## 2024-09-03 DIAGNOSIS — S61.217D LACERATION OF LEFT LITTLE FINGER WITHOUT FOREIGN BODY WITHOUT DAMAGE TO NAIL, SUBSEQUENT ENCOUNTER: ICD-10-CM

## 2024-09-03 DIAGNOSIS — Z48.02 VISIT FOR SUTURE REMOVAL: ICD-10-CM

## 2024-09-03 PROCEDURE — 99212 OFFICE O/P EST SF 10 MIN: CPT | Performed by: PHYSICIAN ASSISTANT

## 2024-09-03 PROCEDURE — 3074F SYST BP LT 130 MM HG: CPT | Performed by: PHYSICIAN ASSISTANT

## 2024-09-03 PROCEDURE — 3079F DIAST BP 80-89 MM HG: CPT | Performed by: PHYSICIAN ASSISTANT

## 2024-09-03 ASSESSMENT — FIBROSIS 4 INDEX: FIB4 SCORE: 1.02

## 2024-09-03 NOTE — PROGRESS NOTES
"  Subjective:     Jignesh Miguel  is a 48 y.o. male who presents for Suture / Staple Removal (Left pinky finger )       Presents today for suture removal over his Left pinky finger that was repaired on 8/24/2024.  Was closed with 6 sutures.  No pain, no drainage or discharge.  Normal function of the finger.       Review of Systems   Skin:         Well healed laceration over left pinky      No Known Allergies  Past Medical History:   Diagnosis Date    Hypertension     Psychiatric problem     anxiety        Objective:   /88   Pulse 85   Temp 36.2 °C (97.1 °F) (Temporal)   Resp 18   Ht 1.727 m (5' 8\")   Wt (!) 127 kg (280 lb)   SpO2 95%   BMI 42.57 kg/m²   Physical Exam  Vitals and nursing note reviewed.   Constitutional:       General: He is not in acute distress.     Appearance: He is not ill-appearing or toxic-appearing.   HENT:      Head: Normocephalic.      Nose: No rhinorrhea.   Eyes:      General: No scleral icterus.     Conjunctiva/sclera: Conjunctivae normal.   Pulmonary:      Effort: Pulmonary effort is normal. No respiratory distress.      Breath sounds: No stridor.   Musculoskeletal:      Cervical back: Neck supple.   Skin:     Comments: Healing laceration on left pinky, no signs of infections   Neurological:      Mental Status: He is alert and oriented to person, place, and time.   Psychiatric:         Mood and Affect: Mood normal.         Behavior: Behavior normal.         Thought Content: Thought content normal.         Judgment: Judgment normal.             Diagnostic testing: None    Assessment/Plan:     Encounter Diagnoses   Name Primary?    Laceration of left little finger without foreign body without damage to nail, subsequent encounter     Visit for suture removal         Plan for care for today's complaint includes removing 5 simple interrupted sutures from his finger.  While previous note from 8/20/2024 did not say that sick stitches were placed I did only see 6 sutures " during today's visit.  Patient did present a photograph taken immediately after suture placement and I could see only 5 stitches in that picture.  Laceration is healing well.  Wound and dressing care discussed.    See AVS Instructions below for written guidance provided to patient on after-visit management and care in addition to our verbal discussion during the visit.    Please note that this dictation was created using voice recognition software. I have attempted to correct all errors, but there may be sound-alike, spelling, grammar and possibly content errors that I did not discover before finalizing the note.    Hebert Hong PA-C

## 2024-09-11 ENCOUNTER — TELEPHONE (OUTPATIENT)
Dept: MEDICAL GROUP | Facility: CLINIC | Age: 49
End: 2024-09-11
Payer: COMMERCIAL

## 2024-09-11 NOTE — TELEPHONE ENCOUNTER
Spouse of patient called asking for a new referral to pulmonary as the one that is in the chart is going to  this year in December and the appointment is for 2025.

## 2024-09-12 DIAGNOSIS — R06.83 SNORING: ICD-10-CM

## 2025-02-13 RX ORDER — LISINOPRIL 20 MG/1
20 TABLET ORAL DAILY
Qty: 90 TABLET | Refills: 3 | Status: SHIPPED | OUTPATIENT
Start: 2025-02-13

## 2025-02-13 NOTE — TELEPHONE ENCOUNTER
Pt is requesting an updated referral   Pulmonary/sleep c  [address], [address]  [place] [address]  Phone: [phone]    Is also requesting med refills.

## 2025-02-13 NOTE — TELEPHONE ENCOUNTER
Received request via: Patient    Was the patient seen in the last year in this department? Yes    Does the patient have an active prescription (recently filled or refills available) for medication(s) requested? No    Pharmacy Name: [facility] [address] [place] [address]     Does the patient have prison Plus and need 100-day supply? (This applies to ALL medications) Patient does not have SCP

## 2025-03-03 ASSESSMENT — ENCOUNTER SYMPTOMS: SLEEP DISTURBANCE: 0

## 2025-03-07 ENCOUNTER — OFFICE VISIT (OUTPATIENT)
Dept: SLEEP MEDICINE | Facility: MEDICAL CENTER | Age: 50
End: 2025-03-07
Attending: FAMILY MEDICINE
Payer: COMMERCIAL

## 2025-03-07 VITALS
OXYGEN SATURATION: 95 % | HEART RATE: 82 BPM | RESPIRATION RATE: 6 BRPM | WEIGHT: 291.2 LBS | DIASTOLIC BLOOD PRESSURE: 86 MMHG | SYSTOLIC BLOOD PRESSURE: 130 MMHG | BODY MASS INDEX: 44.13 KG/M2 | HEIGHT: 68 IN

## 2025-03-07 DIAGNOSIS — G47.30 SLEEP DISORDER BREATHING: Primary | ICD-10-CM

## 2025-03-07 DIAGNOSIS — R06.81 WITNESSED EPISODE OF APNEA: ICD-10-CM

## 2025-03-07 DIAGNOSIS — E66.813 CLASS 3 SEVERE OBESITY DUE TO EXCESS CALORIES WITH SERIOUS COMORBIDITY AND BODY MASS INDEX (BMI) OF 40.0 TO 44.9 IN ADULT (HCC): ICD-10-CM

## 2025-03-07 DIAGNOSIS — R06.83 SNORING: ICD-10-CM

## 2025-03-07 DIAGNOSIS — E66.01 CLASS 3 SEVERE OBESITY DUE TO EXCESS CALORIES WITH SERIOUS COMORBIDITY AND BODY MASS INDEX (BMI) OF 40.0 TO 44.9 IN ADULT (HCC): ICD-10-CM

## 2025-03-07 DIAGNOSIS — R06.89 GASPING FOR BREATH: ICD-10-CM

## 2025-03-07 PROCEDURE — 99211 OFF/OP EST MAY X REQ PHY/QHP: CPT | Performed by: FAMILY MEDICINE

## 2025-03-07 PROCEDURE — 99204 OFFICE O/P NEW MOD 45 MIN: CPT | Performed by: STUDENT IN AN ORGANIZED HEALTH CARE EDUCATION/TRAINING PROGRAM

## 2025-03-07 ASSESSMENT — PATIENT HEALTH QUESTIONNAIRE - PHQ9: CLINICAL INTERPRETATION OF PHQ2 SCORE: 0

## 2025-03-07 ASSESSMENT — FIBROSIS 4 INDEX: FIB4 SCORE: 1.04

## 2025-03-07 NOTE — PROGRESS NOTES
[facility] Consult Note     Date: 3/7/2025 / Time: 10:36 AM      Thank you for requesting a sleep medicine consultation on Jignesh Miguel at the sleep center. Presents today with the chief complaints of snoring, witnessed apneas and gasping in sleep. He is referred [place] [address] for evaluation and treatment of sleep disorder breathing .     HISTORY OF PRESENT ILLNESS:     Jignesh Miguel is a 49 y.o. male with hypertension, obesity, and snoring.  Presents to Sleep Clinic for evaluation of sleep.    He is accompanied by his wife at today's visit.    He has been told he snores loudly in his sleep for years.  In addition to the snoring there is concern regarding pauses in breathing and gasping.  He feels that there is nothing wrong.  His gasping and snoring and stopping breathing at night he is unaware of.  He finds his sleep restorative.    He will sometimes have difficulty getting to sleep with a taking 30 minutes to an hour on average but he does not find this bothersome.  He will awaken between 2 and 5 times a night but generally can get back to sleep without issue.  He often naps after work between 30 minutes to an hour.    As per supplemental questionnaire to be scanned or imported into chart:    [place] Sleepiness Score: 0    Sleep Schedule  Bedtime: Weekday 730-8pm Weekend 730 to 10pm   Wake time: Weekday 4am Weekend 7am   Sleep-onset latency: 30 min to 1 hour, not bothersome   Awakenings from sleep: 2-5  Difficulty falling back asleep: No  Bedroom partner: yes   Naps: after work 30min to 60 min     DAYTIME SYMPTOMS:   Excessive daytime sleepiness: No   Daytime fatigue: No   Difficulty concentrating: Yes  Memory problems: at times   Irritability: yes   Work/school performance issues: No   Sleepiness with driving: NA  Caffeine/stimulant use: No   Alcohol use:Yes, How Many? Socially      SLEEP RELATED SYMPTOMS  Snoring: Yes  Witnessed  "apnea or gasping/choking: Yes  Dry mouth or mouth breathing: No  Night Sweating: Yes  Teeth grinding/biting: No   Morning headaches: No   Refreshed Upon Awakening: Yes     SLEEP RELATED BEHAVIORS:  Parasomnias (walking, talking, eating, violence): No   Leg kicking: No   Restless legs - \"urge to move\": No   Nightmares: No  Recurrent: No   Dream enactment: No      NARCOLEPSY:  Cataplexy: No   Sleep paralysis: No   Sleep attacks: No   Hypnagogic/hypnopompic hallucinations: No     MEDICAL HISTORY  Past Medical History:   Diagnosis Date    Apnea, sleep     Gasping for breath     Hypertension     Insomnia     Psychiatric problem     anxiety    Snoring         SURGICAL HISTORY  Past Surgical History:   Procedure Laterality Date    ORCHIECTOMY  4/8/2015    Performed by Alejandro Alatorre M.D. at SURGERY [facility]        FAMILY HISTORY  No family history on file.    SOCIAL HISTORY  Social History     Socioeconomic History    Marital status:    Tobacco Use    Smoking status: Never    Smokeless tobacco: Never    Tobacco comments:     Chew zyn occ one a day    Vaping Use    Vaping status: Never Used   Substance and Sexual Activity    Alcohol use: Yes     Comment: social    Drug use: No        Occupation: [employer]      CURRENT MEDICATIONS  Current Outpatient Medications   Medication Sig Dispense Refill    lisinopril (PRINIVIL) 20 MG Tab Take 1 Tablet by mouth every day. 90 Tablet 3     No current facility-administered medications for this visit.       REVIEW OF SYSTEMS  Constitutional: Denies fevers, Denies weight changes  Ears/Nose/Throat/Mouth: Denies nasal congestion or sore throat   Cardiovascular: Denies chest pain  Respiratory: Denies shortness of breath, Denies cough  Gastrointestinal/Hepatic: Denies nausea, vomiting  Sleep: see HPI    Physical Examination:  Vitals/ General Appearance:   Weight/BMI: Body mass index is 44.28 kg/m².  /86 (BP Location: Left arm, Patient Position: Sitting, BP Cuff Size: Large " "adult)   Pulse 82   Resp (!) 6   Ht 1.727 m (5' 8\")   Wt (!) 132 kg (291 lb 3.2 oz)   SpO2 95%   Vitals:    03/07/25 1032   BP: 130/86   BP Location: Left arm   Patient Position: Sitting   BP Cuff Size: Large adult   Pulse: 82   Resp: (!) 6   SpO2: 95%   Weight: (!) 132 kg (291 lb 3.2 oz)   Height: 1.727 m (5' 8\")       Pt. is alert and oriented to time, place and person. Cooperative and in no apparent distress.     Constitutional: Alert, no distress, well-groomed.  Skin: No rashes in visible areas.  Eye: Round. Conjunctiva clear, lids normal. No icterus.   ENT EXAM  Nasal alae/valves collapsible: No   Nasal septum deviation: No   Nasal turbinate hypertrophy: No   Hard palate narrow: No   Hard palate high: No   Soft palate/uvula (Mallampati score): 4  Tonsils\" 2+  Tongue Scalloping: No   Retrognathia: No   Micrognathia: No   Cardiovascular:no murmus/gallops/rubs, normal S1 and S2 heart sounds, regular rate and rhythm  Pulmonary:Clear to auscultation, No wheezes, No crackles.  Neurologic:Awake, alert and oriented x 3, Normal age appropriate gait, No involuntary motions.  Extremities: No clubbing, cyanosis, or edema       ASSESSMENT AND PLAN   Jignesh Miguel is a 49 y.o. male with hypertension, obesity, and snoring.  Presents to Sleep Clinic for evaluation of sleep.    1. Jignesh Miguel  has symptoms of Obstructive Sleep Apnea (EUGENE). Jignesh Miguel has symptoms of snoring, choking/gasping during sleep, witnessed apnea.  Pt has risk factors for EUGENE include obesity, thick neck, and crowded oropharynx.     The pathophysiology of EUGENE and the increased risk of cardiovascular morbidity from untreated EUGENE is discussed in detail with the patient. He  also has HTN, which can be worsened by EUGENE.      We have discussed diagnostic options including in-laboratory, attended polysomnography and home sleep testing. We have also discussed treatment options including airway pressurization, reconstructive " otolaryngologic surgery, dental appliances and weight management.     Subsequently,treatment options will be discussed based on the diagnostic study. Meanwhile, He is urged to avoid supine sleep, weight gain and alcoholic beverages since all of these can worsen EUGENE. He is cautioned against drowsy driving.     Plan  -  He  will be scheduled for an overnight  HST to assess sleep related breathing disorder.  - Follow up 1-2 weeks after sleep study to discuss results and treatment options moving forward   -Advised to reach out via MyChart or by phone with any questions or concerns.       Please note portions of this record was created using voice recognition software. I have made every reasonable attempt to correct obvious errors, but I expect that there are errors of grammar and possibly content I did not discover before finalizing the note.

## 2025-03-28 ENCOUNTER — HOME STUDY (OUTPATIENT)
Dept: SLEEP MEDICINE | Facility: MEDICAL CENTER | Age: 50
End: 2025-03-28
Attending: STUDENT IN AN ORGANIZED HEALTH CARE EDUCATION/TRAINING PROGRAM
Payer: COMMERCIAL

## 2025-03-28 DIAGNOSIS — R06.81 WITNESSED EPISODE OF APNEA: ICD-10-CM

## 2025-03-28 DIAGNOSIS — E66.01 CLASS 3 SEVERE OBESITY DUE TO EXCESS CALORIES WITH SERIOUS COMORBIDITY AND BODY MASS INDEX (BMI) OF 40.0 TO 44.9 IN ADULT (HCC): ICD-10-CM

## 2025-03-28 DIAGNOSIS — R06.89 GASPING FOR BREATH: ICD-10-CM

## 2025-03-28 DIAGNOSIS — G47.30 SLEEP DISORDER BREATHING: ICD-10-CM

## 2025-03-28 DIAGNOSIS — R06.83 SNORING: ICD-10-CM

## 2025-03-28 DIAGNOSIS — E66.813 CLASS 3 SEVERE OBESITY DUE TO EXCESS CALORIES WITH SERIOUS COMORBIDITY AND BODY MASS INDEX (BMI) OF 40.0 TO 44.9 IN ADULT: ICD-10-CM

## 2025-03-28 PROCEDURE — 95800 SLP STDY UNATTENDED: CPT | Performed by: STUDENT IN AN ORGANIZED HEALTH CARE EDUCATION/TRAINING PROGRAM

## 2025-04-02 NOTE — PROCEDURES
DIAGNOSTIC HOME SLEEP TEST (HST) REPORT WatchPAT      PATIENT ID:  NAME:  Jignesh Miguel  MRN:               [id number]  YOB: 1975  DATE OF STUDY: 03/28/2025      Impression:     This study shows evidence of:      1. Severe obstructive sleep apnea with PAT apnea hypopnea index(pAHI) of 90.5 per hour.  PAT respiratory disturbance index (pRDI) was 91.1 per hour. These findings are based on 7 channels recording of PAT signal with sleep staging, heart rate, pulse oximetry, actigraphy, body position, snoring and respiratory movement.     2. Oxygenation O2 Sat. mean O2 sat was 89%,  charlee was 69%,  and maximum O2 at 98 %. O2 sat was at or  below 88% for 115 min of evaluation time. Oxygen Desaturation (>=4%) Index was 70.7/hr. AVG HR was 63 BPM.      TECHNICAL DESCRIPTION: Patient underwent home sleep apnea testing with peripheral arterial tone signal (WatchPAT™). This is a Type IV portable monitor and device per Medicare. Monitoring was done with 7 channels recording of PAT signal with sleep staging, heart rate, pulse oximetry, actigraphy, body position, snoring and respiratory movement. Prior to using the device, the patient received verbal and written instructions for its application and was provided with the help desk phone number for additional telephonic instruction with 24-hour availability of qualified personnel to answer questions.    Respiratory events:      General sleep summary: . Total recording time is 6 hours and 58 minutes and total Sleep time is 5 hours and 40 minutes. The patient spent 67 minutes in the supine position and 119 minutes in the nonsupine position.      Recommendations:    1. Given severity of obstructive sleep apnea and nocturnal hypoxia, patient would benefit from undergoing in lab polysomnography CPAP/BiPAP titration study.  There is the potential patient may require more advanced modes of PAP therapy or supplemental oxygen with PAP therapy which is best  assessed in the sleep lab. If starting auto CPAP therapy would recommend minimum pressure 5 cmH2O maximum pressure 15 cmH2O.    2. In general patients with sleep apnea are advised to avoid alcohol and sedatives and to not operate a motor vehicle while drowsy. In some cases alternative treatment options may prove effective in resolving sleep apnea in these options include upper airway surgery, the use of a dental orthotic or weight loss and positional therapy. Clinical correlation is required.

## 2025-04-18 ENCOUNTER — OFFICE VISIT (OUTPATIENT)
Dept: SLEEP MEDICINE | Facility: MEDICAL CENTER | Age: 50
End: 2025-04-18
Payer: COMMERCIAL

## 2025-04-18 VITALS
HEART RATE: 93 BPM | OXYGEN SATURATION: 92 % | DIASTOLIC BLOOD PRESSURE: 84 MMHG | RESPIRATION RATE: 16 BRPM | HEIGHT: 68 IN | BODY MASS INDEX: 43.59 KG/M2 | SYSTOLIC BLOOD PRESSURE: 134 MMHG | WEIGHT: 287.6 LBS

## 2025-04-18 DIAGNOSIS — G47.33 OSA (OBSTRUCTIVE SLEEP APNEA): ICD-10-CM

## 2025-04-18 DIAGNOSIS — E66.813 CLASS 3 SEVERE OBESITY WITHOUT SERIOUS COMORBIDITY WITH BODY MASS INDEX (BMI) OF 40.0 TO 44.9 IN ADULT: ICD-10-CM

## 2025-04-18 PROCEDURE — 99213 OFFICE O/P EST LOW 20 MIN: CPT

## 2025-04-18 PROCEDURE — 3079F DIAST BP 80-89 MM HG: CPT

## 2025-04-18 PROCEDURE — 3075F SYST BP GE 130 - 139MM HG: CPT

## 2025-04-18 PROCEDURE — 99214 OFFICE O/P EST MOD 30 MIN: CPT

## 2025-04-18 ASSESSMENT — FIBROSIS 4 INDEX: FIB4 SCORE: 1.04

## 2025-04-18 NOTE — PROGRESS NOTES
[facility] Follow-up Visit    CC:   Chief Complaint   Patient presents with    Follow-Up     Last Office Visit 3/7/2025 with Dr. Leggett     Study Complete on 3/28/2025          HPI:  Jignesh Miguel is a 49 y.o.male present today to discuss sleep study results. Patient was seen by sleep medicine 3/7/25 by Dr. Leggett for initial consultation for symptoms consistent with sleep apnea. Patient completed in lab study which showed  AHI 90.5 and severe oxygen desaturation. Recommendations were made for return for titration study given severity of nocturnal hypoxia.     Patient has lost 4 lbs since last visit. He has been trying to be more active and has been walking laps around work during down times.     After discussing the recommendation to return for in-lab titration study patient became visibly upset and aggressive and raising his voice stating that there was no point in doing initial testing if he would need to return for treatment and that he would not be able to sleep in an in lab study. He made multiple statements about requiring multiple visits and charging the patient for multiple tests and that in the end he might not feel any better. Multiple episodes of yelling and arguing between the patient and the patient's wife who accompanied the patient. Patient interrupted this clinician many times while trying to explain the rationale behind additional testing and also describing alternative pathway of starting APAP now with close follow up, follow up OPO when/if stable with low residual AHI and addressing continued low O2. Discussed benefits of in lab testing. Patient made comments to the effect that this clinician is taking away his autonomy and making him do an in lab study even after APAP with the previous mentioned follow up was reiterated as another option and that the ultimate decision is his. In the end patient decided to pursue in lab study.     Sleep History  3/28/25 - HST, pAHI 90.5, pRDI  91.1, mean O2 sat was 89%,  charlee was 69%,  and maximum O2 at 98 %. O2 sat was at or  below 88% for 115 min of evaluation time. Rec:  Given severity of obstructive sleep apnea and nocturnal hypoxia, patient would benefit from undergoing in lab polysomnography CPAP/BiPAP titration study.  There is the potential patient may require more advanced modes of PAP therapy or supplemental oxygen with PAP therapy which is best assessed in the sleep lab.    Patient Active Problem List    Diagnosis Date Noted    Snoring 12/06/2023    Health care maintenance 12/06/2023    Class 3 severe obesity without serious comorbidity with body mass index (BMI) of 40.0 to 44.9 in adult 12/06/2023    Primary hypertension 11/08/2021    Fleshy skin mole 11/08/2021    Disorder of male genital organs 04/08/2015       Past Medical History:   Diagnosis Date    Apnea, sleep     Gasping for breath     Hypertension     Insomnia     Psychiatric problem     anxiety    Snoring         Past Surgical History:   Procedure Laterality Date    ORCHIECTOMY  4/8/2015    Performed by Alejandro Alatorre M.D. at SUR[facility]ERY [facility]       No family history on file.    Social History     Socioeconomic History    Marital status:      Spouse name: Not on file    Number of children: Not on file    Years of education: Not on file    Highest education level: Not on file   Occupational History    Not on file   Tobacco Use    Smoking status: Never    Smokeless tobacco: Never    Tobacco comments:     Chew zyn occ one a day    Vaping Use    Vaping status: Never Used   Substance and Sexual Activity    Alcohol use: Yes     Comment: social    Drug use: No    Sexual activity: Not on file   Other Topics Concern    Not on file   Social History Narrative    Not on file     Social Drivers of Health     Financial Resource Strain: Not on file   Food Insecurity: Not on file   Transportation Needs: Not on file   Physical Activity: Not on file   Stress: Not on file   Social  "Connections: Not on file   Intimate Partner Violence: Not on file   Housing Stability: Not on file       Current Outpatient Medications   Medication Sig Dispense Refill    lisinopril (PRINIVIL) 20 MG Tab Take 1 Tablet by mouth every day. 90 Tablet 3     No current facility-administered medications for this visit.        ALLERGIES: Patient has no known allergies.    ROS  Constitutional: Denies fevers, Denies weight changes  Ears/Nose/Throat/Mouth: Denies nasal congestion or sore throat   Cardiovascular: Denies chest pain  Respiratory: Denies shortness of breath, Denies cough  Gastrointestinal/Hepatic: Denies nausea, vomiting  Sleep: see HPI      PHYSICAL EXAM  /84 (BP Location: Left arm, Patient Position: Sitting, BP Cuff Size: Adult)   Pulse 93   Resp 16   Ht 1.727 m (5' 8\") Comment: per patient  Wt (!) 130 kg (287 lb 9.6 oz)   SpO2 92%   BMI 43.73 kg/m²   Constitutional: Alert, no distress, well-groomed.  Skin: Warm, dry, good turgor, no rashes in visible areas.  Eye: Equal, round and reactive, conjunctiva clear, lids normal.  ENMT: Lips without lesions, good dentition, moist mucous membranes.  Neck: Trachea midline, no masses, no thyromegaly.  Respiratory: Unlabored respiratory effort, no cough.  MSK: Normal gait, moves all extremities.  Neuro: Grossly non-focal.   Psych: verbally aggressive, argumentative, hostile       Medical Decision Making   Assessment and Plan  The medical record was reviewed.    Obstructive sleep apnea  - home sleep apnea tests reviewed. Based on results, it is recommended that patient return for in lab titration study due to severity of nocturnal hypoxia and the that potential patient may require more advanced modes of PAP therapy or supplemental oxygen with PAP therapy which is best assessed in the sleep lab. Discussed alternative pathway if patient desires to start APAP and patient declined.   - Patients with EUGENE are at increased risk of cardiovascular disease including " coronary artery disease, systemic arterial hypertension, pulmonary arterial hypertension, cardiac arrythmias, and stroke. Positive airway pressure will favorably impact many of the adverse conditions and effects provoked by EUGENE. Avoid driving a motor vehicle when drowsy  - Order placed for titration study    - Advised to reach out via MyChart with questions   -Offered follow up and ordering of the machine over MyChart based on titration study results and patient declined.      2. Class 3 severe obesity without serious comorbidity with body mass index (BMI) of 40.0 to 44.9 in adult  - Continue efforts to lose weight including healthy diet and moderate exercise 5 days a week for about 30 minutes.        Return in about 10 weeks (around 6/27/2025) for sleep study results appx 2 weeks after sleep study .   - Recommend an earlier appointment, if significant treatment barriers develop.  - Patient to follow up with the appropriate healthcare practitioners for all other medical problems and issues.    Please note portions of this record was created using voice recognition software. I have made every reasonable attempt to correct obvious errors, but I expect that there are errors of grammar and possibly content I did not discover before finalizing the note.    Total time care for the patient this date was 30 minutes. Time spent includes chart review, lab review, discussion with myself. This time was spent separate from device analysis /programming on this date.

## 2025-06-06 ENCOUNTER — APPOINTMENT (OUTPATIENT)
Dept: SLEEP MEDICINE | Facility: MEDICAL CENTER | Age: 50
End: 2025-06-06
Payer: COMMERCIAL

## 2025-06-06 DIAGNOSIS — G47.33 OSA (OBSTRUCTIVE SLEEP APNEA): ICD-10-CM

## 2025-06-06 PROCEDURE — 95811 POLYSOM 6/>YRS CPAP 4/> PARM: CPT | Mod: 26 | Performed by: STUDENT IN AN ORGANIZED HEALTH CARE EDUCATION/TRAINING PROGRAM

## 2025-06-09 NOTE — PROCEDURES
PPatient: ANDREW GUPTA  ID: [id number] Date: 6/6/2025   MONTAGE: Standard  STUDY TYPE: Treatment  RECORDING TECHNIQUE:   After the scalp was prepared, gold plated electrodes were applied to the scalp according to the International 10-20 System. EEG (electroencephalogram) was continuously monitored from the O1-M2, O2-M1, C3-M2, C4-M1, F3-M2, and F4-M1. EOGs (electrooculograms) were monitored by electrodes placed at the left and right outer canthi. Chin EMG (electromyogram) was monitored by electrodes placed on the mentalis and sub-mentalis muscles. Nasal and oral airflow were monitored using a triple port thermocouple as well as oronasal pressure transducer. Respiratory effort was measured by inductive plethysmography technology employing abdominal and thoracic belts. Blood oxygen saturation and pulse were monitored by pulse oximetry. Heart rhythm was monitored by surface electrocardiogram. Leg EMG was studied using surface electrodes placed on left and right anterior tibialis. A microphone was used to monitor tracheal sounds and snoring. Body position was monitored and documented by technician observation.   SCORING CRITERIA:   A modification of the AASM manual for scoring of sleep and associated events was used. Obstructive apneas were scored by cessation of airflow for at least 10 seconds with continuing respiratory effort. Central apneas were scored by cessation of airflow for at least 10 seconds with no respiratory effort. Hypopneas were scored by a 30% or more reduction in airflow for at least 10 seconds accompanied by arterial oxygen desaturation of 3% or an arousal. For CMS (Medicare) patients, per AASM rule 1B, hypopneas are scored by 30% with mild reduction in airflow for at least 10 seconds accompanied by arterial saturation decreased at 4%.    Study start time was 10:07:32 PM. Diagnostic recording time was 6h 55.5m with a total sleep time of 5h 35.0m resulting in a sleep efficiency of 80.63%%. Sleep  latency from the start of the study was 17 minutes and the latency from sleep to REM was 68 minutes. In total,56 arousals were scored for an arousal index of 10.0.  Respiratory:  There were a total of 2 apneas consisting of 1 obstructive apneas, 0 mixed apneas, and 1 central apneas. A total of 185 hypopneas were scored. The apnea index was 0.36 per hour and the hypopnea index was 33.13 per hour resulting in an overall AHI of 33.49. AHI during REM was 34.0 and AHI while supine was 33.49.  Oximetry:  There was a mean oxygen saturation of 91.0%. The minimum oxygen saturation in NREM was 79.0 % and in REM was 77.0%. The patient spent 29.1 minutes of TST with SaO2 <88%.  Cardiac:  The highest heart rate seen while awake was 81 BPM while the highest heart rate during sleep was 80 BPM with an average sleeping heart rate of 63 BPM.  Limb Movements:  There were a total of 110 PLMs during sleep which resulted in a PLMS index of 19.7. Of these, 4 were associated with arousals which resulted in a PLMS arousal index of 0.7.  Titration:   CPAP was tried from 5 to 15. BiPAP was tried from 16/12 to 22/18  This was a fully attended sleep study. This test was technically adequate. This patient was titrated on CPAP starting at 5 cm of water pressure. Patient was titrated up to BiPAP 22/18 cm of water pressure. Patient did best at BIPAP 20/16 cm of water pressure. Patient spent 43 minutes at that pressure and the AHI was 8.3 which is considered Mild obstructive sleep apnea.     Impression:  1.  Patient used CPAP or BiPAP during study  2.  Respiratory events improved with increasing PAP pressure  3.  Patient did require elevated BiPAP pressures  4.  Oxygen saturations improved with BiPAP therapy compared to CPAP therapy  5.  Slight elevation in T CO2 with total time above 55 mmHg of 29 minutes  6.  Supine REM sleep was seen during study    Recommendations:  I recommend auto BiPAP EPAP 15 IPAP 23 pressure support 4 cmH2O.  Patient used  Medium N20 mask during night of study.    I also recommend 30 day compliance download to assess the efficacy to the recommended pressure, measure leak, apnea hypopnea index and compliance for further outpatient monitoring and management of PAP therapy. In some cases alternative treatment options may be proven effective in resolving sleep apnea. These options include upper airway surgery, the use of a dental orthotic, weight loss, or positional therapy. Clinical correlation is required. In general patients with sleep apnea are advised to avoid alcohol, sedatives and not to operate a motor vehicle while drowsy.  Untreated sleep apnea increases the risk for cardiovascular and neurovascular disease.

## 2025-06-23 ENCOUNTER — RESULTS FOLLOW-UP (OUTPATIENT)
Dept: SLEEP MEDICINE | Facility: MEDICAL CENTER | Age: 50
End: 2025-06-23